# Patient Record
Sex: MALE | Race: WHITE | HISPANIC OR LATINO | Employment: FULL TIME | URBAN - METROPOLITAN AREA
[De-identification: names, ages, dates, MRNs, and addresses within clinical notes are randomized per-mention and may not be internally consistent; named-entity substitution may affect disease eponyms.]

---

## 2018-04-19 LAB
T4 FREE SERPL-MCNC: 0.77 NG/DL (ref 0.6–1.7)
TSH SERPL DL<=0.05 MIU/L-ACNC: 10 UIU/M (ref 0.45–5.33)

## 2019-01-04 ENCOUNTER — OFFICE VISIT (OUTPATIENT)
Dept: URGENT CARE | Facility: CLINIC | Age: 39
End: 2019-01-04
Payer: COMMERCIAL

## 2019-01-04 VITALS
TEMPERATURE: 99.1 F | BODY MASS INDEX: 26.66 KG/M2 | HEART RATE: 60 BPM | WEIGHT: 180 LBS | RESPIRATION RATE: 18 BRPM | DIASTOLIC BLOOD PRESSURE: 80 MMHG | HEIGHT: 69 IN | OXYGEN SATURATION: 98 % | SYSTOLIC BLOOD PRESSURE: 114 MMHG

## 2019-01-04 DIAGNOSIS — J01.90 ACUTE SINUSITIS, RECURRENCE NOT SPECIFIED, UNSPECIFIED LOCATION: Primary | ICD-10-CM

## 2019-01-04 PROCEDURE — 99213 OFFICE O/P EST LOW 20 MIN: CPT | Performed by: PHYSICIAN ASSISTANT

## 2019-01-04 RX ORDER — AMOXICILLIN AND CLAVULANATE POTASSIUM 875; 125 MG/1; MG/1
1 TABLET, FILM COATED ORAL EVERY 12 HOURS SCHEDULED
Qty: 20 TABLET | Refills: 0 | Status: SHIPPED | OUTPATIENT
Start: 2019-01-04 | End: 2019-01-14

## 2019-01-04 NOTE — PROGRESS NOTES
[address] [facility]        NAME: Ana Angeles is a 43 y o  male  : 1980    MRN: [id number]  DATE: 2019  TIME: 12:06 PM    Assessment and Plan   Acute sinusitis, recurrence not specified, unspecified location [J01 90]  1  Acute sinusitis, recurrence not specified, unspecified location  amoxicillin-clavulanate (AUGMENTIN) 875-125 mg per tablet     Educated viral vs bacterial; likely a viral URI  Instructed to begin flonase and mucinex  If symtoms persist over 7 days you may begin the antibiotic  Patient Instructions     Follow up with PCP in 3-5 days  Proceed to  ER if symptoms worsen  Chief Complaint     Chief Complaint   Patient presents with    Cold Like Symptoms     C/O sinus congestion, sore throat, post nasal drip, cough and loss of voice x 2 days  History of Present Illness       43 y o  male presents with sinus congestion, sore throat, dry cough for 2 days  Patient states he has had a loss of voice for the last 2 days  States he did not receive a flu shot  Unsure of any positive strep exposures  Patient states there is no difficulty swallowing  He does complain of postnasal drip  Denies fever, body aches  Review of Systems   Review of Systems   Constitutional: Negative for chills, fatigue and fever  HENT: Positive for postnasal drip and sore throat  Negative for congestion, ear pain, sinus pain and trouble swallowing  Eyes: Negative for pain, discharge and redness  Respiratory: Positive for cough  Negative for chest tightness, shortness of breath and wheezing  Cardiovascular: Negative for chest pain, palpitations and leg swelling  Gastrointestinal: Negative for abdominal pain, diarrhea, nausea and vomiting  Musculoskeletal: Negative for arthralgias, joint swelling and myalgias  Skin: Negative for rash  Neurological: Negative for dizziness, weakness, numbness and headaches           Current Medications       Current Outpatient Prescriptions:   amoxicillin-clavulanate (AUGMENTIN) 875-125 mg per tablet, Take 1 tablet by mouth every 12 (twelve) hours for 10 days, Disp: 20 tablet, Rfl: 0    Current Allergies     Allergies as of 01/04/2019    (No Known Allergies)            The following portions of the patient's history were reviewed and updated as appropriate: allergies, current medications, past family history, past medical history, past social history, past surgical history and problem list      History reviewed  No pertinent past medical history  History reviewed  No pertinent surgical history  No family history on file  Medications have been verified  Objective   /80   Pulse 60   Temp 99 1 °F (37 3 °C) (Tympanic)   Resp 18   Ht 5' 9" (1 753 m)   Wt 81 6 kg (180 lb)   SpO2 98%   BMI 26 58 kg/m²        Physical Exam     Physical Exam   Constitutional: He is oriented to person, place, and time  He appears well-developed and well-nourished  No distress  HENT:   Head: Normocephalic  Right Ear: Tympanic membrane and external ear normal    Left Ear: Tympanic membrane and external ear normal    Nose: Nose normal    Mouth/Throat: Uvula is midline and mucous membranes are normal  Posterior oropharyngeal erythema present  Eyes: Pupils are equal, round, and reactive to light  Conjunctivae and EOM are normal    Neck: Normal range of motion  Neck supple  Cardiovascular: Normal rate, regular rhythm and normal heart sounds  No murmur heard  Pulmonary/Chest: Effort normal and breath sounds normal  No respiratory distress  He has no wheezes  Abdominal: Soft  Bowel sounds are normal  There is no tenderness  Musculoskeletal: Normal range of motion  Lymphadenopathy:     He has no cervical adenopathy  Neurological: He is alert and oriented to person, place, and time  He has normal reflexes  Skin: Skin is warm and dry  Psychiatric: He has a normal mood and affect     Nursing note and vitals reviewed

## 2019-02-04 ENCOUNTER — EVALUATION (OUTPATIENT)
Dept: PHYSICAL THERAPY | Facility: CLINIC | Age: 39
End: 2019-02-04
Payer: COMMERCIAL

## 2019-02-04 DIAGNOSIS — M25.551 RIGHT HIP PAIN: Primary | ICD-10-CM

## 2019-02-04 PROCEDURE — G8978 MOBILITY CURRENT STATUS: HCPCS

## 2019-02-04 PROCEDURE — 97162 PT EVAL MOD COMPLEX 30 MIN: CPT

## 2019-02-04 PROCEDURE — G8979 MOBILITY GOAL STATUS: HCPCS

## 2019-02-04 NOTE — LETTER
2019    [facility] [address]    Patient: Kayce Middleton   YOB: 1980   Date of Visit: 2019     Encounter Diagnosis     ICD-10-CM    1  Right hip pain M25 551        Dear Dr Johnson :    Please review the attached Plan of Care from [facility] recent visit  Please verify that you agree therapy should continue by signing the attached document and sending it back to our office  If you have any questions or concerns, please don't hesitate to call  Sincerely,    Dixie Olivarez PT      Referring Provider:      I certify that I have read the below Plan of Care and certify the need for these services furnished under this plan of treatment while under my care  MD Lester Garcia [facility] [phone]  VIA Facsimile: [phone]          PT Evaluation     Today's date: 2019  Patient name: Kayce Middleton  : 1980  MRN: [id number]  Referring provider: Mirza Howe MD  Dx:   Encounter Diagnosis     ICD-10-CM    1  Right hip pain M25 551                   Assessment  Assessment details: Kayce Middleton is a 43 y o  male presenting to outpatient physical therapy with diagnosis of Right hip and upper leg  pain   Patient's current impairments include pain, impaired soft tissue mobility, reduced range of motion, reduced strength,  and reduced activity tolerance  Patient's present functional limitations include difficulty with ADLs with increased need for assistance, reliance on medication and/or modalities for pain relief, poor tolerance for functional mobility and activity, and difficulty completing work/school responsibilities   Patient to benefit from skilled outpatient physical therapy 2x/week for 4-6 weeks in order to reduce pain, maximize pain free range of motion, increase strength and stability, and improve functional mobility/functional activity in order to maximize return to prior level of function with reduced limitations  Thank you for your referral     Impairments: abnormal gait, abnormal or restricted ROM, activity intolerance, impaired physical strength, lacks appropriate home exercise program and pain with function  Functional limitations: diff performing job duties, diff ascending stairs, pain w/ transfers , unable to runBarriers to therapy: none  Understanding of Dx/Px/POC: good  Goals  STGs to be achieved in 4 weeks:  1  Pt to demonstrate reduced subjective pain rating "at worst" by at least 2-3 points from Initial Eval in order to allow for reduced pain with ADLs and improved functional activity tolerance  2  Pt to demonstrate increased AROM of R hip and knee  by at least 5-10 degrees in order to allow for greater ease and independence with ADLs and functional mobility  3  Pt to demonstrate full PROM of R hip  in order to maximize joint mobility and function and allow for progression of exercise program and achievement of goals  4  Pt to demonstrate increased MMT of R hip and knee by at least 1/2-1 grade in order to improve safety and stability with ADLs and functional mobility  LTGs to be achieved in 6-8 weeks:  1  Pt will be I with HEP in order to continue to improve quality of life and independence and reduce risk for re-injury  2  Pt to demonstrate return to running  without limitations or restrictions  3  Pt to demonstrate improved function as noted by achieving or exceeding predicted score on FOTO outcomes assessment tool         Plan  Patient would benefit from: skilled physical therapy  Planned modality interventions: ultrasound and thermotherapy: hydrocollator packs  Planned therapy interventions: manual therapy, strengthening, stretching, therapeutic exercise and home exercise program  Frequency: 2x week  Duration in visits: 12  Duration in weeks: 6  Plan of Care beginning date: 2/4/2019  Plan of Care expiration date: 3/18/2019  Treatment plan discussed with: patient        Re-eval Date: 3/18/19    Date 19       Visit Count 1       FOTO yes       Pain In        Pain Out              Subjective Evaluation    History of Present Illness  Mechanism of injury: Pt began with R upper leg pain and spasms over the summer  Began with ms relaxants and pain meds  Had good response to this and when pt finished meds, pain ret'd slowly  Works in [employer] and is on his feet 10 hrs/day  Walks approx 14 miles / day according to I watch  Pain worse at night, diff sleeping  Over the past month pain has been radiating up into R hip  Not a recurrent problem   Quality of life: fair    Pain  Current pain rating: 3  At best pain rating: 3  At worst pain ratin  Location: R thigh rad up into hip  Quality: dull ache, radiating and pressure (sharp pain with pivoting, and transfers)  Relieving factors: medications and support  Aggravating factors: stair climbing and walking (sleeping)  Progression: no change    Social Support  Steps to enter house: yes (2)  Stairs in house: yes   Lives in: multiple-level home  Lives with: spouse and parents    Employment status: working (job duties incl putting down pallets, lifting,some pushing and pulling)  Hand dominance: right      Diagnostic Tests  No diagnostic tests performed  Treatments  Current treatment: physical therapy  Patient Goals  Patient goals for therapy: return to sport/leisure activities, decreased pain, improved balance and increased strength          Objective     Observations     Additional Observation Details  Pt amb indep w/o asst devices with min antalgic gt on R    Lumbar Screen  Lumbar range of motion within normal limits      Neurological Testing     Sensation     Hip   Left Hip   Intact: light touch    Right Hip   Intact: light touch    Reflexes   Left   Patellar (L4): normal (2+)    Right   Patellar (L4): normal (2+)    Active Range of Motion   Left Hip   Flexion: 105 degrees   Abduction: 55 degrees   External rotation (90/90): 40 degrees   Internal rotation (90/90): 25 degrees     Right Hip   Flexion: 105 (supine) degrees   Extension: 20 degrees   Abduction: 23 degrees   External rotation (90/90): 32 degrees   Internal rotation (90/90): 23 degrees   Left Knee   Flexion: 145 degrees   Extension: 0 degrees     Right Knee   Flexion: 137 degrees   Extension: 0 degrees     Passive Range of Motion     Right Hip   Flexion: 125 degrees   Abduction: 60 degrees     Strength/Myotome Testing     Left Hip   Planes of Motion   Flexion: 4+  Extension: 5  Abduction: 5  Adduction: 5  External rotation: 4+  Internal rotation: 5    Right Hip   Planes of Motion   Flexion: 4  Extension: 5  Abduction: 4  Adduction: 3+  External rotation: 3+  Internal rotation: 3+    Left Knee   Flexion: 5  Extension: 5    Right Knee   Flexion: 4+  Extension: 4          Precautions: none  Daily Treatment Diary     Manual              Ham stretch             Hip flex stretch             Quad stretch             ITB stretch                              Exercise Diary              Nustep             Leg ext mach             Leg curl mach             Leg press             1/2 squats             Step ups   Fwd  Lat  rev             Incline calf stretch             TRs/HRs             4 way SLRs             bridges             SAQs                                                                                                                                      Modalities

## 2019-02-04 NOTE — PROGRESS NOTES
PT Evaluation     Today's date: 2019  Patient name: Parviz Pope  : 1980  MRN: [id number]  Referring provider: Karen Cyr MD  Dx:   Encounter Diagnosis     ICD-10-CM    1  Right hip pain M25 551                   Assessment  Assessment details: Parviz Pope is a 43 y o  male presenting to outpatient physical therapy with diagnosis of Right hip and upper leg  pain   Patient's current impairments include pain, impaired soft tissue mobility, reduced range of motion, reduced strength,  and reduced activity tolerance  Patient's present functional limitations include difficulty with ADLs with increased need for assistance, reliance on medication and/or modalities for pain relief, poor tolerance for functional mobility and activity, and difficulty completing work/school responsibilities  Patient to benefit from skilled outpatient physical therapy 2x/week for 4-6 weeks in order to reduce pain, maximize pain free range of motion, increase strength and stability, and improve functional mobility/functional activity in order to maximize return to prior level of function with reduced limitations  Thank you for your referral     Impairments: abnormal gait, abnormal or restricted ROM, activity intolerance, impaired physical strength, lacks appropriate home exercise program and pain with function  Functional limitations: diff performing job duties, diff ascending stairs, pain w/ transfers , unable to runBarriers to therapy: none  Understanding of Dx/Px/POC: good  Goals  STGs to be achieved in 4 weeks:  1  Pt to demonstrate reduced subjective pain rating "at worst" by at least 2-3 points from Initial Eval in order to allow for reduced pain with ADLs and improved functional activity tolerance  2  Pt to demonstrate increased AROM of R hip and knee  by at least 5-10 degrees in order to allow for greater ease and independence with ADLs and functional mobility     3  Pt to demonstrate full PROM of R hip  in order to maximize joint mobility and function and allow for progression of exercise program and achievement of goals  4  Pt to demonstrate increased MMT of R hip and knee by at least 1/2-1 grade in order to improve safety and stability with ADLs and functional mobility  LTGs to be achieved in 6-8 weeks:  1  Pt will be I with HEP in order to continue to improve quality of life and independence and reduce risk for re-injury  2  Pt to demonstrate return to running  without limitations or restrictions  3  Pt to demonstrate improved function as noted by achieving or exceeding predicted score on FOTO outcomes assessment tool  Plan  Patient would benefit from: skilled physical therapy  Planned modality interventions: ultrasound and thermotherapy: hydrocollator packs  Planned therapy interventions: manual therapy, strengthening, stretching, therapeutic exercise and home exercise program  Frequency: 2x week  Duration in visits: 12  Duration in weeks: 6  Plan of Care beginning date: 2019  Plan of Care expiration date: 3/18/2019  Treatment plan discussed with: patient        Re-eval Date: 3/18/19    Date 19       Visit Count 1       FOTO yes       Pain In        Pain Out              Subjective Evaluation    History of Present Illness  Mechanism of injury: Pt began with R upper leg pain and spasms over the summer  Began with ms relaxants and pain meds  Had good response to this and when pt finished meds, pain ret'd slowly  Works in [employer] and is on his feet 10 hrs/day  Walks approx 14 miles / day according to I watch  Pain worse at night, diff sleeping  Over the past month pain has been radiating up into R hip            Not a recurrent problem   Quality of life: fair    Pain  Current pain rating: 3  At best pain rating: 3  At worst pain ratin  Location: R thigh rad up into hip  Quality: dull ache, radiating and pressure (sharp pain with pivoting, and transfers)  Relieving factors: medications and support  Aggravating factors: stair climbing and walking (sleeping)  Progression: no change    Social Support  Steps to enter house: yes (2)  Stairs in house: yes   Lives in: multiple-level home  Lives with: spouse and parents    Employment status: working (job duties incl putting down pallets, lifting,some pushing and pulling)  Hand dominance: right      Diagnostic Tests  No diagnostic tests performed  Treatments  Current treatment: physical therapy  Patient Goals  Patient goals for therapy: return to sport/leisure activities, decreased pain, improved balance and increased strength          Objective     Observations     Additional Observation Details  Pt amb indep w/o asst devices with min antalgic gt on R    Lumbar Screen  Lumbar range of motion within normal limits      Neurological Testing     Sensation     Hip   Left Hip   Intact: light touch    Right Hip   Intact: light touch    Reflexes   Left   Patellar (L4): normal (2+)    Right   Patellar (L4): normal (2+)    Active Range of Motion   Left Hip   Flexion: 105 degrees   Abduction: 55 degrees   External rotation (90/90): 40 degrees   Internal rotation (90/90): 25 degrees     Right Hip   Flexion: 105 (supine) degrees   Extension: 20 degrees   Abduction: 23 degrees   External rotation (90/90): 32 degrees   Internal rotation (90/90): 23 degrees   Left Knee   Flexion: 145 degrees   Extension: 0 degrees     Right Knee   Flexion: 137 degrees   Extension: 0 degrees     Passive Range of Motion     Right Hip   Flexion: 125 degrees   Abduction: 60 degrees     Strength/Myotome Testing     Left Hip   Planes of Motion   Flexion: 4+  Extension: 5  Abduction: 5  Adduction: 5  External rotation: 4+  Internal rotation: 5    Right Hip   Planes of Motion   Flexion: 4  Extension: 5  Abduction: 4  Adduction: 3+  External rotation: 3+  Internal rotation: 3+    Left Knee   Flexion: 5  Extension: 5    Right Knee   Flexion: 4+  Extension: 4          Precautions: none  Daily Treatment Diary     Manual              Ham stretch             Hip flex stretch             Quad stretch             ITB stretch                              Exercise Diary              Nustep             Leg ext mach             Leg curl mach             Leg press             1/2 squats             Step ups   Fwd  Lat  rev             Incline calf stretch             TRs/HRs             4 way SLRs             bridges             SAQs                                                                                                                                      Modalities

## 2019-02-05 ENCOUNTER — OFFICE VISIT (OUTPATIENT)
Dept: PHYSICAL THERAPY | Facility: CLINIC | Age: 39
End: 2019-02-05
Payer: COMMERCIAL

## 2019-02-05 DIAGNOSIS — M25.551 RIGHT HIP PAIN: Primary | ICD-10-CM

## 2019-02-05 PROCEDURE — 97110 THERAPEUTIC EXERCISES: CPT

## 2019-02-05 PROCEDURE — 97140 MANUAL THERAPY 1/> REGIONS: CPT

## 2019-02-05 NOTE — PROGRESS NOTES
Daily Note     Today's date: 2019  Patient name: Sergio Orona  : 1980  MRN: [id number]  Referring provider: Shira Carpenter MD  Dx:   Encounter Diagnosis     ICD-10-CM    1  Right hip pain M25 551                   Re-eval Date: 3/18/19    Date 19      Visit Count 1 2      FOTO yes       Pain In  2-3      Pain Out            Subjective: Pt states he is not in much pain upon arrival and states he feels much looser afyer today's tx  Objective: See treatment diary below      Assessment: Tolerated treatment well  Patient exhibited good technique with therapeutic exercises and would benefit from continued PT      Plan: Progress treatment as tolerated          Precautions: none  Daily Treatment Diary     Manual  2/5            Ham stretch 3x30"            Hip flex stretch 3x30" with strap            Quad stretch 3x30"            ITB stretch 3x30"                             Exercise Diary  2/5            Nustep L3 10 min            Leg ext mach 22# 30            Leg curl mach 33# 30              Leg press 80#  30            1/2 squats             Step ups   Fwd  Lat  rev             Incline calf stretch             TRs/HRs             4 way SLRs 30            bridges 20x5"            SAQs 3# 30x                                                                                                                                     Modalities

## 2019-02-11 ENCOUNTER — OFFICE VISIT (OUTPATIENT)
Dept: PHYSICAL THERAPY | Facility: CLINIC | Age: 39
End: 2019-02-11
Payer: COMMERCIAL

## 2019-02-11 DIAGNOSIS — M25.551 RIGHT HIP PAIN: Primary | ICD-10-CM

## 2019-02-11 PROCEDURE — 97140 MANUAL THERAPY 1/> REGIONS: CPT

## 2019-02-11 PROCEDURE — 97110 THERAPEUTIC EXERCISES: CPT

## 2019-02-11 NOTE — PROGRESS NOTES
Daily Note     Today's date: 2019  Patient name: Emy Hernández  : 1980  MRN: [id number]  Referring provider: Evonne Stark MD  Dx:   Encounter Diagnosis     ICD-10-CM    1  Right hip pain M25 551          Re-eval Date: 3/18/19    Date 19 2 11 19     Visit Count 1 2 3     FOTO yes       Pain In  2-3 -3/10     Pain Out   -2/10               Subjective: Pt  States Pain @ R hip and upper thigh region persists  Level = "-3"/10      Objective: See treatment diary below      Assessment: Tolerated treatment fairly well overall    Patient would benefit from continued PT      Plan: Con't  services 2x/week as per POC/Goals  Precautions: none  Daily Treatment Diary     Manual   2 11 19      Ham stretch 3x30" 3x30"      Hip flex stretch 3x30" with strap 3x30"      Quad stretch 3x30" 3x30"      ITB stretch 3x30" 3x30"                  Exercise Diary   2 11 19      Nustep L3 10 min L3 10 min        Leg ext mach 22# 30 22# 30        Leg curl mach 33# 30 33# 30      Leg press 80#  30 80# 40x      1/2 squats  *20x        Step ups   Fwd  Lat  rev  *NV      Incline calf stretch  *B 5x20"      TRs/HRs  * 30x        4 way SLRs 30 30 reps ea   L        bridges 20x5" 20x5"        SAQs 3# 30x 3# 30x                                                                                    Modalities   2 11 19      Moist Heat  R Hip  R Upper thigh  10 min ea

## 2019-02-13 ENCOUNTER — APPOINTMENT (OUTPATIENT)
Dept: PHYSICAL THERAPY | Facility: CLINIC | Age: 39
End: 2019-02-13
Payer: COMMERCIAL

## 2019-02-15 ENCOUNTER — OFFICE VISIT (OUTPATIENT)
Dept: PHYSICAL THERAPY | Facility: CLINIC | Age: 39
End: 2019-02-15
Payer: COMMERCIAL

## 2019-02-15 DIAGNOSIS — M25.551 RIGHT HIP PAIN: Primary | ICD-10-CM

## 2019-02-15 PROCEDURE — 97110 THERAPEUTIC EXERCISES: CPT

## 2019-02-15 PROCEDURE — 97140 MANUAL THERAPY 1/> REGIONS: CPT

## 2019-02-15 NOTE — PROGRESS NOTES
Daily Note     Today's date: 2/15/2019  Patient name: Anisa Awad  : 1980  MRN: [id number]  Referring provider: Chet Perales MD  Dx:   Encounter Diagnosis     ICD-10-CM    1  Right hip pain M25 551          Re-eval Date: 3/18/19    Date 19 2 11 19 2 15 19    Visit Count 1 2 3 4    FOTO yes       Pain In  2-3 -3/10 4/10    Pain Out   -2/10 1/10                Subjective:  Pt  States he had a very busy and active workday yesterday, which in turn created greater symptoms at his R Hip and R LB  Pain level up to "4" at this present time  Objective: See treatment diary below      Assessment: Tolerated treatment Fair+ overall    Patient had decreased pain level at conclusion of today's treatment session  Plan: Continue per plan of care  Precautions: none  Daily Treatment Diary     Manual   2 11 19 2 15 19     Ham stretch 3x30" 3x30" 4x20"     Hip flex stretch 3x30" with strap 3x30" 4x20"     Quad stretch 3x30" 3x30" 4x20"     ITB stretch 3x30" 3x30" 4x20"                 Exercise Diary   2 11 19 2 15 19     Nustep L3 10 min L3 10 min   L2 10 min     Leg ext mach 22# 30 22# 30   22# 30     Leg curl mach 33# 30 33# 30 33# 30     Leg press 80#  30 80# 40x 80# 40x     1/2 squats  *20x   20x     Step ups   Fwd  Lat  rev  *NV 1x5 reps  * for trial     Incline calf stretch  *B 5x20" B 5x20"     TRs/HRs  * 30x   30x     4 way SLRs 30 30 reps ea  R   30 reps ea   R     bridges 20x5" 20x5"   20x5"     SAQs 3# 30x 3# 30x   30x 5sec hold R                                                                                 Modalities   2 11 19 2 15 19     Moist Heat  R Hip  R Upper thigh  10 min ea R Hip/LB  R Upper thigh  10 min ea

## 2019-02-18 ENCOUNTER — TRANSCRIBE ORDERS (OUTPATIENT)
Dept: PHYSICAL THERAPY | Facility: CLINIC | Age: 39
End: 2019-02-18

## 2019-02-18 ENCOUNTER — OFFICE VISIT (OUTPATIENT)
Dept: PHYSICAL THERAPY | Facility: CLINIC | Age: 39
End: 2019-02-18
Payer: COMMERCIAL

## 2019-02-18 DIAGNOSIS — M25.551 RIGHT HIP PAIN: Primary | ICD-10-CM

## 2019-02-18 PROCEDURE — 97110 THERAPEUTIC EXERCISES: CPT

## 2019-02-18 PROCEDURE — 97140 MANUAL THERAPY 1/> REGIONS: CPT

## 2019-02-18 NOTE — PROGRESS NOTES
Daily Note     Today's date: 2019  Patient name: Chip Mosquera  : 1980  MRN: [id number]  Referring provider: Kellee Cash MD  Dx:   Encounter Diagnosis     ICD-10-CM    1  Right hip pain M25 551      Re-eval Date: 3/18/19    Date 19 2 11 19 2 15 19 2 18 19   Visit Count 1 2 3 4 5   FOTO yes       Pain In  2-3 -3/10 4/10 1/10   Pain Out   1-2/10 1/10 1/10                Subjective:  Pt  States  pain level is "1"/10 this morning, @ R hip and ant/lat quad regions, however it was "6" last night  Objective: See treatment diary below      Assessment: Tolerated treatment fairly well overall    Patient feels much better after therapy each time, especially with the manual stretching  Plan: Continue per plan of care  Precautions: none  Daily Treatment Diary     Manual   2 11 19 2 15 19 2 18 19    Ham stretch 3x30" 3x30" 4x20" 4x20"    Hip flex stretch 3x30" with strap 3x30" 4x20" 4x20"    Quad stretch 3x30" 3x30" 4x20" 4x20"    ITB stretch 3x30" 3x30" 4x20" 4x20"                Exercise Diary   2 11 19 2 15 19 2 18 19    Nustep L3 10 min L3 10 min   L2 10 min L2 11 min    Leg ext mach 22# 30 22# 30   22# 30 22# 40x    Leg curl mach 33# 30 33# 30 33# 30 33# 40x    Leg press 80#  30 80# 40x 80# 40x 85# 30x    1/2 squats  *20x   20x 25x    Step ups   Fwd  Lat  rev  *NV 1x5 reps  * for trial *Fwd onto [employer]  Up R, Down L  1x15 reps    Incline calf stretch  *B 5x20" B 5x20" B 6x20"    TRs/HRs  * 30x   30x 40x    4 way SLRs 30 30 reps ea  R   30 reps ea   R 2x20 ea    bridges 20x5" 20x5"   20x5" 25x 5"    SAQs 3# 30x 3# 30x   30x 5sec hold R 30x 5sec hold R    Stand Quad Stretch    *R 5x10"                                                                        Modalities   2 11 19 2 15 19 2 18 19    Moist Heat  R Hip  R Upper thigh  10 min ea R Hip/LB  R Upper thigh  10 min ea Deferred

## 2019-02-19 ENCOUNTER — OFFICE VISIT (OUTPATIENT)
Dept: PHYSICAL THERAPY | Facility: CLINIC | Age: 39
End: 2019-02-19
Payer: COMMERCIAL

## 2019-02-19 DIAGNOSIS — M25.551 RIGHT HIP PAIN: Primary | ICD-10-CM

## 2019-02-19 PROCEDURE — 97140 MANUAL THERAPY 1/> REGIONS: CPT

## 2019-02-19 PROCEDURE — 97110 THERAPEUTIC EXERCISES: CPT

## 2019-02-19 NOTE — PROGRESS NOTES
Daily Note     Today's date: 2019  Patient name: Emy Hernández  : 1980  MRN: [id number]  Referring provider: Evonne Stark MD  Dx:   Encounter Diagnosis     ICD-10-CM    1  Right hip pain M25 551          Re-eval Date: 3/18/19    Date 19       Visit Count 6       FOTO        Pain In 1/10       Pain Out 0-1/10                 Subjective:  "Pretty good", is pt's response to current status of R Hip/Quad regions  Says his pain level today is only "one-eileen "      Objective: See treatment diary below      Assessment: Tolerated treatment Fairly Well  Plan: Progress treatment as tolerated  Con't services 2x/week        Precautions: none  Daily Treatment Diary     Manual  2 19 19       Ham stretch 4x30"         Hip flex stretch 4x30"        Quad stretch 5x20"  *Stand with towel assist       ITB stretch 4x20"                       Exercise Diary  2 19 19       Nustep L3 12 min         Leg ext mach 22# 45         Leg curl mach 33# 40       Leg press 85# 40x         1/2 squats 25x         Step upsFwd  Lat  rev Fwd onto AeroMat  Up R, Down L  1x20 reps       Incline calf stretch B 6x20"       TRs/HRs 45x         4 way SLRs 2x20         bridges 25x5"         SAQs 30x 5sec hold R       Stand Quad Stretch R 5x 20"                                                                           Modalities  2 19 19       Moist Heat Deferred

## 2019-02-25 ENCOUNTER — OFFICE VISIT (OUTPATIENT)
Dept: PHYSICAL THERAPY | Facility: CLINIC | Age: 39
End: 2019-02-25
Payer: COMMERCIAL

## 2019-02-25 DIAGNOSIS — M25.551 RIGHT HIP PAIN: Primary | ICD-10-CM

## 2019-02-25 PROCEDURE — 97140 MANUAL THERAPY 1/> REGIONS: CPT

## 2019-02-25 PROCEDURE — 97110 THERAPEUTIC EXERCISES: CPT

## 2019-02-25 NOTE — PROGRESS NOTES
Daily Note     Today's date: 2019  Patient name: Fallon Dhillon  : 1980  MRN: [id number]  Referring provider: Richard Orellana MD  Dx:   Encounter Diagnosis     ICD-10-CM    1  Right hip pain M25 551          Re-eval Date: 3/18/19    Date 2  2 25 19      Visit Count 6 7      FOTO        Pain In 1/10 1/10      Pain Out 0-1/10 0-10                Subjective:  No specific complaints or concerns voiced by pt  Today  Objective: See treatment diary below      Assessment: Tolerated treatment Fairly Well overall    Patient noted with R Quad Mm soreness with some of the exercises performed today, specifically the machines  Plan: Continue per plan of care         Precautions: none  Daily Treatment Diary     Manual  2  2 25 19      Ham stretch 4x30"   5x30"      Hip flex stretch 4x30"  5x30"      Quad stretch 5x20"  *Stand with towel assist 5x20"  *Stand with Hand assist      ITB stretch 4x20"     5x20"                      Exercise Diary  2  19 2 25 19      Nustep L3 12 min   *SRC  L2 12 min      Leg ext mach 22# 45   22# 45      Leg curl mach 33# 40 33# 45      Leg press 85# 40x   85# 45x      1/2 squats 25x   25x      Step upsFwd    Lat    rev Fwd onto AeroMat  Up R, Down L  1x20 reps Fwd onto AeroMat  Up R, Down L  1x25 reps      Incline calf stretch B 6x20" B 7x20"      TRs/HRs 45x   50x      4 way SLRs 2x20   2x20      bridges 25x5"   25x5"      SAQs 30x 5sec hold R 30x 5sec hold R      Stand Quad Stretch R 5x 20" R 6x 20"                                                                          Modalities  2  19 2 25 19      Moist Heat Deferred Deferred

## 2019-02-26 ENCOUNTER — OFFICE VISIT (OUTPATIENT)
Dept: PHYSICAL THERAPY | Facility: CLINIC | Age: 39
End: 2019-02-26
Payer: COMMERCIAL

## 2019-02-26 DIAGNOSIS — M25.551 RIGHT HIP PAIN: Primary | ICD-10-CM

## 2019-02-26 PROCEDURE — 97140 MANUAL THERAPY 1/> REGIONS: CPT

## 2019-02-26 PROCEDURE — 97110 THERAPEUTIC EXERCISES: CPT

## 2019-02-26 NOTE — PROGRESS NOTES
Daily Note     Today's date: 2019  Patient name: Sandrine Elias  : 1980  MRN: [id number]  Referring provider: Sandy Savage MD  Dx:   Encounter Diagnosis     ICD-10-CM    1  Right hip pain M25 551      Re-eval Date: 3/18/19    Date 2 19 19 2 25 19 2 26 19     Visit Count 6 7 8     FOTO   YES     Pain In 1/10 1/10 > 1     Pain Out 0-1/10 0/10 0/10                  Subjective:  No complaints or concerns voiced today re: R Hip  Objective: See treatment diary below      Assessment: Tolerated treatment well  Patient with continued progress thus far  Plan: Continue per plan of care       Precautions: none  Daily Treatment Diary     Manual  2 19 19 2 25 19 2 26 19     Ham stretch 4x30"   5x30" 5x30"     Hip flex stretch 4x30"  5x30" 5x30"     Quad stretch 5x20"  *Stand with towel assist 5x20"  *Stand with Hand assist 6x20"  *Stand with Hand assist     ITB stretch 4x20"     5x20"     5x20"                 Exercise Diary  2 19 19 2 25 19 2 26 19     Nustep L3 12 min   *SRC  L2 12 min SRC  L3 12 min     Leg ext mach 22# 45   22# 45 33# 30x     Leg curl mach 33# 40 33# 45 33# 45     Leg press 85# 40x   85# 45x Squat 90# 40x   *calf raise 50# 30x     1/2 squats 25x   25x 30x     Step upsFwd    Lat    rev Fwd onto AeroMat  Up R, Down L  1x20 reps Fwd onto AeroMat  Up R, Down L  1x25 reps Fwd onto AeroMat  Up R, Down L  1x25 reps     Incline calf stretch B 6x20" B 7x20" B 7x20"     TRs/HRs 45x   50x *On leg Press     4 way SLRs 2x20   2x20 3x15     bridges 25x5"   25x5" 30x 5"     SAQs 30x 5sec hold R 30x 5sec hold R 30x 5sec hold R     Stand Quad Stretch R 5x 20" R 6x 20" R 6x 20"                                                                         Modalities  2 19 19 2 25 19 2 26 19     Moist Heat Deferred Deferred Deferred

## 2019-03-04 ENCOUNTER — OFFICE VISIT (OUTPATIENT)
Dept: PHYSICAL THERAPY | Facility: CLINIC | Age: 39
End: 2019-03-04
Payer: COMMERCIAL

## 2019-03-04 DIAGNOSIS — M25.551 RIGHT HIP PAIN: Primary | ICD-10-CM

## 2019-03-04 PROCEDURE — 97110 THERAPEUTIC EXERCISES: CPT

## 2019-03-04 PROCEDURE — 97140 MANUAL THERAPY 1/> REGIONS: CPT

## 2019-03-04 NOTE — PROGRESS NOTES
Daily Note     Today's date: 3/4/2019  Patient name: Fallon Dhillon  : 1980  MRN: [id number]  Referring provider: Richard Orellana MD  Dx:   Encounter Diagnosis     ICD-10-CM    1  Right hip pain M25 551      Re-eval Date: 3/18/19    Date 2 19 19 2 25 19 2 26 19 3 4 19    Visit Count 6 7 8 9    FOTO   YES     Pain In 1/10 1/10 > 1 0/10    Pain Out 0-1/10 0/10 010 010                 Subjective:  Pt  States he has "No Pain" @ R hip or R Quad  Regions today  Objective: See treatment diary below      Assessment: Tolerated treatment well  Patient demonstrating consistent progress thus far  Plan: Continue per plan of care       Precautions: none  Daily Treatment Diary     Manual  2 19 19 2 25 19 2 26 19 3 4 19    Ham stretch 4x30"   5x30" 5x30" 5x30"    Hip flex stretch 4x30"  5x30" 5x30" 5x30"    Quad stretch 5x20"  *Stand with towel assist 5x20"  *Stand with Hand assist 6x20"  *Stand with Hand assist 6x20"  *Stand with Hand assist    ITB stretch 4x20"     5x20"     5x20" 5x20"                Exercise Diary  2 19 19 2 25 19 2 26 19 3 4 19    Nustep L3 12 min   *[facility]  L2 12 min SRC  L3 12 min SRC  L3 12 min    Leg ext mach 22# 45   22# 45 33# 30x 33# 45x    Leg curl mach 33# 40 33# 45 33# 45 44# 30x    Leg press 85# 40x   85# 45x Squat 90# 40x   *calf raise 50# 30x Squat 90# 45x   calf raise 60# 30x    1/2 squats 25x   25x 30x 30x    Step upsFwd    Lat    rev Fwd onto AeroMat  Up R, Down L  1x20 reps Fwd onto AeroMat  Up R, Down L  1x25 reps Fwd onto AeroMat  Up R, Down L  1x25 reps Fwd onto 4" step 30x  Up R, Down L  1x30 reps  *Lat 30x    Incline calf stretch B 6x20" B 7x20" B 7x20" B 8x20"    TRs/HRs 45x   50x *On leg Press     4 way SLRs 2x20   2x20 3x15 1 5# 3x10ea    bridges 25x5"   25x5" 30x 5" 30x 5"    SAQs 30x 5sec hold R 30x 5sec hold R 30x 5sec hold R 40x 5sec hold R    Stand Quad Stretch R 5x 20" R 6x 20" R 6x 20" R 7x 20" Modalities  2 19 19 2 25 19 2 26 19 3 4 19    Moist Heat Deferred Deferred Deferred Declined

## 2019-03-05 ENCOUNTER — OFFICE VISIT (OUTPATIENT)
Dept: PHYSICAL THERAPY | Facility: CLINIC | Age: 39
End: 2019-03-05
Payer: COMMERCIAL

## 2019-03-05 DIAGNOSIS — M25.551 RIGHT HIP PAIN: Primary | ICD-10-CM

## 2019-03-05 PROCEDURE — 97140 MANUAL THERAPY 1/> REGIONS: CPT

## 2019-03-05 PROCEDURE — 97110 THERAPEUTIC EXERCISES: CPT

## 2019-03-05 NOTE — PROGRESS NOTES
Daily Note     Today's date: 3/5/2019  Patient name: Angie Jimenez  : 1980  MRN: [id number]  Referring provider: Jeanna Avila MD  Dx:   Encounter Diagnosis     ICD-10-CM    1  Right hip pain M25 551          Re-eval Date: 3/18/19    Date 2 19 19 2 25 19 2 26 19 3 4 19 3 5 19   Visit Count 6 7 8 9 10   FOTO   YES     Pain In 1/10 1/10 > 1 0/10 0/10   Pain Out 0-1/10 010 010 0/10 0/10             Subjective:  Pt  States he's doing very well today re: status of R Hip, and has been doing good the last several days, as well  Objective: See treatment diary below      Assessment: Tolerated treatment well  Patient progressing very well to date  Plan:  Con't services 2x/week as per POC/Goals      Precautions: none  Daily Treatment Diary     Manual  2 19 19 2 25 19 2 26 19 3 4 19 3 5 19   Ham stretch 4x30"   5x30" 5x30" 5x30" 6x30"   Hip flex stretch 4x30"  5x30" 5x30" 5x30" 6x30"   Quad stretch 5x20"  *Stand with towel assist 5x20"  *Stand with Hand assist 6x20"  *Stand with Hand assist 6x20"  *Stand with Hand assist 6x20"  Stand with Hand assist   ITB stretch 4x20"     5x20"     5x20" 5x20" 6x20"               Exercise Diary  2 19 19 2 25 19 2 26 19 3 4 19 3 5 19   Nustep L3 12 min   *SRC  L2 12 min SRC  L3 12 min SRC  L3 12 min SRC  L3 13 min   Leg ext mach 22# 45   22# 45 33# 30x 33# 45x 33# 45x   Leg curl mach 33# 40 33# 45 33# 45 44# 30x 44# 30x   Leg press 85# 40x   85# 45x Squat 90# 40x   *calf raise 50# 30x Squat 90# 45x   calf raise 60# 30x Squat 90# 45x   calf raise 60# 40x   1/2 squats 25x   25x 30x 30x 35x   Step upsFwd    Lat    rev Fwd onto AeroMat  Up R, Down L  1x20 reps Fwd onto AeroMat  Up R, Down L  1x25 reps Fwd onto AeroMat  Up R, Down L  1x25 reps Fwd onto 4" step 30x  Up R, Down L  1x30 reps  *Lat 30x Fwd onto 4" step 30x  Up R, Down L  1x30 reps  *Lat 30x   Incline calf stretch B 6x20" B 7x20" B 7x20" B 8x20" B 8x20"   TRs/HRs 45x   50x *On leg Press     4 way SLRs 2x20   2x20 3x15 1 5# 3x10ea 1 5# 3x10ea   bridges 25x5"   25x5" 30x 5" 30x 5" 35x 5"   SAQs 30x 5sec hold R 30x 5sec hold R 30x 5sec hold R 40x 5sec hold R 40x 5sec hold R   Stand Quad Stretch R 5x 20" R 6x 20" R 6x 20" R 7x 20" R 7x 20"                                                                       Modalities  2 19 19 2 25 19 2 26 19 3 4 19 3 5 19   Moist Heat Deferred Deferred Deferred Declined deferred

## 2019-03-11 ENCOUNTER — OFFICE VISIT (OUTPATIENT)
Dept: PHYSICAL THERAPY | Facility: CLINIC | Age: 39
End: 2019-03-11
Payer: COMMERCIAL

## 2019-03-11 DIAGNOSIS — M25.551 RIGHT HIP PAIN: Primary | ICD-10-CM

## 2019-03-11 PROCEDURE — 97140 MANUAL THERAPY 1/> REGIONS: CPT

## 2019-03-11 PROCEDURE — 97110 THERAPEUTIC EXERCISES: CPT

## 2019-03-11 NOTE — PROGRESS NOTES
Daily Note     Today's date: 3/11/2019  Patient name: Michael Shah  : 1980  MRN: [id number]  Referring provider: Delbert Willson MD  Dx:   Encounter Diagnosis     ICD-10-CM    1  Right hip pain M25 551      Re-eval Date: 3/18/19    Date 3 11 19       Visit Count 11       FOTO        Pain In 2/10 R Knee  0/10 R Hip       Pain Out 1/10 R Knee  0/10 R Hip       Next MD Visit                     Subjective:  Pt  States his R Hip and Quad regions are "Good"  Says however, his R Knee is the issue today  Objective: See treatment diary below      Assessment: Tolerated treatment Fairly Well overall  Patient noted with improved strength and decreased pain @ R Hip to date, as per his feedback  Plan: Progress treatment as tolerated  Con't  Services 2x/week as per POC      Precautions: none  Daily Treatment Diary     Manual  3 11 19       Ham stretch 6x30"         Hip flex stretch 6x30"        Quad stretch 8x30"  Stand with Hand assist       ITB stretch 6x20"                       Exercise Diary  3 11 19       Nustep *[address] [facility]  L4 13 min         Leg ext mach 33# 45x         Leg curl mach 44# 40x       Leg press Squat 90# 45x   calf raise 60# 40x         1/2 squats 35x         Step upsFwd    Lat    rev Fwd onto 4" step 30x  Up R, Down L  1x30 reps  Lat 30x  *Rev 30x       Incline calf stretch B 8x30"       TRs/HRs *On Square Foam 30x         4 way SLRs 1 5# 2x20         bridges 35x 5"         SAQs 40x 5sec hold R       Stand Quad Stretch R 8x 30"                                                                           Modalities  3 11 19       Moist Heat Deferred

## 2019-03-12 ENCOUNTER — OFFICE VISIT (OUTPATIENT)
Dept: PHYSICAL THERAPY | Facility: CLINIC | Age: 39
End: 2019-03-12
Payer: COMMERCIAL

## 2019-03-12 DIAGNOSIS — M25.551 RIGHT HIP PAIN: Primary | ICD-10-CM

## 2019-03-12 PROCEDURE — 97110 THERAPEUTIC EXERCISES: CPT

## 2019-03-12 PROCEDURE — 97140 MANUAL THERAPY 1/> REGIONS: CPT

## 2019-03-12 NOTE — PROGRESS NOTES
Daily Note     Today's date: 3/12/2019  Patient name: Citlalli Harris  : 1980  MRN: [id number]  Referring provider: Paul Easley MD  Dx:   Encounter Diagnosis     ICD-10-CM    1  Right hip pain M25 551          Re-eval Date: 3/18/19    Date 3 11 19 3 12 19      Visit Count 11 12      FOTO        Pain In 210 R Knee  0/10 R Hip 2/10 R Knee  0/10 R Hip      Pain Out 1/10 R Knee  0/10 R Hip 1/10 R Knee  0/10 R Hip      Next MD Visit                 Subjective:  Pt  States he's stiff today, which he attributes to activity yesterday and last night  Objective: See treatment diary below      Assessment: Tolerated treatment Fairly Well overall  Plan: Con't  services 2x/week as per POC/Goals Plan for re-eval next week  MD follow-up scheduled for 3 20  19      Precautions: none  Daily Treatment Diary     Manual  3 11 19 3 12 19      Ham stretch 6x30"   6x30"      Hip flex stretch 6x30"  6x30"      Quad stretch 8x30"  Stand with Hand assist 8x30"  Stand with Hand assist      ITB stretch 6x20"     6x20"                  Exercise Diary  3 11 19 3 12 19      Nustep *[address] [facility]  L4 13 min   SRC  L4 13 min      Leg ext mach 33# 45x   33# 45x      Leg curl mach 44# 40x 44# 40x      Leg press Squat 90# 45x   calf raise 60# 40x   Squat 90# 45x   calf raise 60# 40x      1/2 squats 35x   40x      Step upsFwd    Lat    rev Fwd onto 4" step 30x  Up R, Down L  1x30 reps  Lat 30x  *Rev 30x Fwd onto 4" step 30x  Up R, Down L  1x30 reps  Lat 30x  *Rev 30x      Incline calf stretch B 8x30" B 8x30"      TRs/HRs *On Square Foam 30x   On Square Foam 30x      4 way SLRs 1 5# 2x20   Resume NV      bridges 35x 5"   Resume NV      SAQs 40x 5sec hold R Resume NV      Stand Quad Stretch R 8x 30" R 8x 30"                                                                          Modalities  3 11 19 3 12 19      Moist Heat Deferred deferred

## 2019-03-18 ENCOUNTER — OFFICE VISIT (OUTPATIENT)
Dept: PHYSICAL THERAPY | Facility: CLINIC | Age: 39
End: 2019-03-18
Payer: COMMERCIAL

## 2019-03-18 ENCOUNTER — APPOINTMENT (OUTPATIENT)
Dept: PHYSICAL THERAPY | Facility: CLINIC | Age: 39
End: 2019-03-18
Payer: COMMERCIAL

## 2019-03-18 DIAGNOSIS — M25.551 RIGHT HIP PAIN: Primary | ICD-10-CM

## 2019-03-18 PROCEDURE — 97110 THERAPEUTIC EXERCISES: CPT

## 2019-03-18 PROCEDURE — 97140 MANUAL THERAPY 1/> REGIONS: CPT

## 2019-03-18 NOTE — PROGRESS NOTES
Daily Note     Today's date: 3/18/2019  Patient name: Jailyn Dunn  : 1980  MRN: [id number]  Referring provider: Kj Garcia MD  Dx:   Encounter Diagnosis     ICD-10-CM    1  Right hip pain M25 551          Re-eval Date: 3/18/19    Date 3 11 19 3 12 19 3 18 19     Visit Count 11 12 13     FOTO        Pain In 2/10 R Knee  0/10 R Hip 2/10 R Knee  0/10 R Hip 2/10 R Knee  0/10 R Hip     Pain Out 1/10 R Knee  0/10 R Hip 1/10 R Knee  0/10 R Hip 1/10 R Knee  0/10 R Hip     Next MD Visit                Subjective:  Pt  States his R Hip is doing fine, however his R Knee is about a "2" Pain level  Objective: See treatment diary below      Assessment: Tolerated treatment well  Patient progressing consistently thus far  Improved ROM, flexibility, and strength, as per observation, and as per pt's feedback      Plan: Plan is for re-eval upon next treatment session tomorrow (To be provided by PT)  Con't services as per POC/Goals        Manual  3 11 19 3 12 19 3 18 19     Ham stretch 6x30"   6x30" 6x30"     Hip flex stretch 6x30"  6x30" 6x30"     Quad stretch 8x30"  Stand with Hand assist 8x30"  Stand with Hand assist 9x30"  Stand with Hand assist     ITB stretch 6x20"     6x20" 6x20"                 Exercise Diary  3 11 19 3 12 19 3 18 19     Nustep *[facility]  L4 13 min   SRC  L4 13 min SRC  L4 14 min     Leg ext mach 33# 45x   33# 45x 44# 30x     Leg curl mach 44# 40x 44# 40x 44# 45x     Leg press Squat 90# 45x   calf raise 60# 40x   Squat 90# 45x   calf raise 60# 40x Squat 95# 30x   calf raise 65# 30x     1/2 squats 35x   40x 40x     Step upsFwd    Lat    rev Fwd onto 4" step 30x  Up R, Down L  1x30 reps  Lat 30x  *Rev 30x Fwd onto 4" step 30x  Up R, Down L  1x30 reps  Lat 30x  *Rev 30x Fwd onto 4" step 40x  Up R, Down L  1x40 reps  Lat 40x  *Rev 30x     Incline calf stretch B 8x30" B 8x30" B 9x30"     TRs/HRs *On Square Foam 30x   On Square Foam 30x On Square Foam 40x     4 way SLRs 1 5# 2x20   Resume NV 1 5# 2x20     bridges 35x 5"   Resume NV 35x 5"     SAQs 40x 5sec hold R Resume NV 40x 5sec hold R     Stand Quad Stretch R 8x 30" R 8x 30" R 9x 30"                                                                         Modalities  3 11 19 3 12 19 3 18 19     Moist Heat Deferred deferred deferred

## 2019-03-19 ENCOUNTER — EVALUATION (OUTPATIENT)
Dept: PHYSICAL THERAPY | Facility: CLINIC | Age: 39
End: 2019-03-19
Payer: COMMERCIAL

## 2019-03-19 DIAGNOSIS — M25.551 RIGHT HIP PAIN: Primary | ICD-10-CM

## 2019-03-19 PROCEDURE — 97164 PT RE-EVAL EST PLAN CARE: CPT | Performed by: PHYSICAL THERAPIST

## 2019-03-19 PROCEDURE — 97140 MANUAL THERAPY 1/> REGIONS: CPT | Performed by: PHYSICAL THERAPIST

## 2019-03-19 PROCEDURE — 97110 THERAPEUTIC EXERCISES: CPT | Performed by: PHYSICAL THERAPIST

## 2019-03-19 NOTE — LETTER
2019    MD Lester [address]send [address] [place] [address]    Patient: Margarette Evans   YOB: 1980   Date of Visit: 3/19/2019     Encounter Diagnosis     ICD-10-CM    1  Right hip pain M25 551        Dear Dr Brian Hoover:    Please review the attached Plan of Care from [facility] recent visit  Please verify that you agree therapy should continue by signing the attached document and sending it back to our office  If you have any questions or concerns, please don't hesitate to call  Sincerely,    Rylan Grayson, PT      Referring Provider:      I certify that I have read the below Plan of Care and certify the need for these services furnished under this plan of treatment while under my care  MD Lester Townsend [address]: [phone]          PT Re-Evaluation     Today's date: 3/19/2019  Patient name: Margarette Evans  : 1980  MRN: [id number]  Referring provider: Lieutenant Vito MD  Dx:   Encounter Diagnosis     ICD-10-CM    1  Right hip pain M25 551        Assessment/Plan  Assessment details: Margarette Evans is a 43 y o  male presenting to outpatient physical therapy for Re-Evaluation of Right hip and upper leg pain  The patient has attended 13 PT visits for this  Mr Robin Si reports improved right hip symptoms and he has since returned to some short distance jogging without complaints of right hip pain  His gait is no longer antalgic  He does however report that his right knee is especially bothersome when he is resting or standing for prolonged periods  General screen does not reveal any swelling, ligamentous instability or tenderness to palpation to the knee joint specifically  He does report some tenderness to palpation of the distal ITB and lateral hamstrings  He demonstrates hamstring tightness   Mr Norma Natarajan would benefit from updated POC to focus on treatment of the right knee/lateral knee and quadriceps with emphasis on manual therapy  Patient's present functional limitations include difficulty with ADLs with increased need for assistance, reliance on medication and/or modalities for pain relief, poor tolerance for functional mobility and activity, and difficulty completing work/school responsibilities  Patient to benefit from skilled outpatient physical therapy 2x/week for 4 weeks in order to reduce pain, maximize pain free range of motion, increase strength and stability, and improve functional mobility/functional activity in order to maximize return to prior level of function with reduced limitations  Thank you for your referral     Impairments: activity intolerance, impaired physical strength, lacks appropriate home exercise program and pain with function  Functional limitations: diff performing job duties, muscle spasm in knee flexion/resting positions , unable to run previous long distances  Barriers to therapy: none  Understanding of Dx/Px/POC: good  Goals  STGs to be achieved in 4 weeks:  1  Pt to demonstrate reduced subjective pain rating "at worst" by at least 2-3 points from Initial Eval in order to allow for reduced pain with ADLs and improved functional activity tolerance  Progress: Pain today 0/10    2  Pt to demonstrate increased AROM of R hip and knee  by at least 5-10 degrees in order to allow for greater ease and independence with ADLs and functional mobility  Progress: met  3  Pt to demonstrate full PROM of R hip  in order to maximize joint mobility and function and allow for progression of exercise program and achievement of goals  Progress MET  4  Pt to demonstrate increased MMT of R hip and knee by at least 1/2-1 grade in order to improve safety and stability with ADLs and functional mobility  Progress: Ongoing, continued weakness noted to right hip extensors and  Hip IRs  LTGs to be achieved in 6-8 weeks:  1   Pt will be I with HEP in order to continue to improve quality of life and independence and reduce risk for re-injury  Progress: Pt reports independence with his HEP  2  Pt to demonstrate return to running  without limitations or restrictions  Progress: Patient reports he just started running a half a mile  He wants to get back to running about 6 miles a day  3  Pt to demonstrate improved function as noted by achieving or exceeding predicted score on FOTO outcomes assessment tool  Progress: Ongoing    Updated Goals  3/19/2019  1) Patient will report right lateral knee/hamstring spasm reduced to less than 1 incident in 5 day periods with pain report 0/10 for 3 consecutive days  Plan  Patient would benefit from: skilled physical therapy  Planned modality interventions: ultrasound and thermotherapy: hydrocollator packs  Planned therapy interventions: manual therapy, strengthening, stretching, therapeutic exercise and home exercise program  Frequency: 2x week  Duration in visits: 8  Duration in weeks: 4  Plan of Care beginning date: 3/19/2019  Plan of Care expiration date: 4/20/19  Treatment plan discussed with: patient      Subjective    Subjective Evaluation    History of Present Illness  Mechanism of injury: Pt began with R upper leg pain and spasms over the summer  Began with ms relaxants and pain meds  Had good response to this and when pt finished meds, pain ret'd slowly  Works in [employer] and is on his feet 10 hrs/day  Walks approx 14 miles / day according to I watch  Pain was worse at night and he had diff sleeping  Prior to starting PT the pain had been radiating up into R hip  Patient reports his hip pain at night and during activity is "pretty much non-existant"  He reports his quad pain has improved  He reports increased right knee pain  "If I sit I have to have my knee propped up, the more I sit it won't relax and I feel my hamstring spasming   He reports pain at rest however "when I'm standing and walking it's not too bad" however when standing for prolonged periods at work he has to prop his right knee up  He reports the knee pain started within the last two weeks          Not a recurrent problem   Quality of life: fair      Social Support  Steps to enter house: yes (2)  Stairs in house: yes   Lives in: multiple-level home  Lives with: spouse and parents    Employment status: working (job duties incl putting down pallets, lifting,some pushing and pulling)  Hand dominance: right      Diagnostic Tests  No diagnostic tests performed  Treatments  Current treatment: physical therapy  Patient Goals  Patient goals for therapy: return to sport/leisure activities, decreased pain, improved balance and increased strength      Objective  Objective     Observations     Additional Observation Details  Gait symmetrical, non-antalgic  Lumbar Screen  Lumbar range of motion within normal limits  Neurological Testing     Sensation   Bilateral LEs, intact to LT       Reflexes   Left   Patellar (L4): normal (2+)    Right   Patellar (L4): normal (2+)    Passive Range of Motion   Left Hip   Flexion: 125 degrees   Abduction: 55 degrees   External rotation (90/90): 45 degrees   Internal rotation (90/90): 25 degrees     Right Hip   Flexion: 125 (supine) degrees   Extension: 20 degrees   Abduction: 25 degrees   External rotation (90/90): 45 degrees   Internal rotation (90/90): 23 degrees   Left Knee   Flexion: 145 degrees   Extension: 0 degrees     Right Knee   Flexion: 142 degrees   Extension: 0 degrees       Strength/Myotome Testing     Left Hip   Planes of Motion   Flexion: 4+  Extension: 5  Abduction: 5  Adduction: 5  External rotation: 4+  Internal rotation: 5    Right Hip   Planes of Motion   Flexion: 4  Extension: 4-  Abduction: 4  Adduction: 4-  External rotation: 4  Internal rotation: 4-    Left Knee   Flexion: 5  Extension: 5    Right Knee   Flexion: 4+  Extension: 4     Flowsheet Rows      Most Recent Value   PT/OT G-Codes   Current Score  66   Projected Score  65 Re-eval Date: 3/18/19    Date 3 11 19 3 12 19 3 18 19 3/19/19    Visit Count 11 12 13 14    FOTO        Pain In 2/10 R Knee  0/10 R Hip 2/10 R Knee  0/10 R Hip 2/10 R Knee  0/10 R Hip 2/10 R knee  0/10 R hip    Pain Out 1/10 R Knee  0/10 R Hip 1/10 R Knee  0/10 R Hip 1/10 R Knee  0/10 R Hip 1/10 R knee  0/10 R hip    Next MD Visit March 20th March 20th March 20th       Objective: See treatment diary below  Precautions: Standard    Manual  3 11 19 3 12 19 3 18 19 3/19/19    Ham stretch 6x30"   6x30" 6x30"     Hip flex stretch 6x30"  6x30" 6x30"     Quad stretch 8x30"  Stand with Hand assist 8x30"  Stand with Hand assist 9x30"  Stand with Hand assist     ITB stretch 6x20"     6x20" 6x20"     Soft tissue mobility to right lateral hamstrings and ITB  x10min    Massage roller right ITB and ham     X5min (self massage today)    Foam roller ITB            NP this visit 2/2 re-eval         Exercise Diary  3 11 19 3 12 19 3 18 19 3/19/19    Warm up *[facility]  L4 13 min   SRC  L4 13 min SRC  L4 14 min SRC L4, x16min      Leg ext mach 33# 45x   33# 45x 44# 30x NV    Leg curl mach 44# 40x 44# 40x 44# 45x NV    Leg press Squat 90# 45x   calf raise 60# 40x   Squat 90# 45x   calf raise 60# 40x Squat 95# 30x   calf raise 65# 30x NV    1/2 squats 35x   40x 40x NV, add TB    Step upsFwd    Lat    rev Fwd onto 4" step 30x  Up R, Down L  1x30 reps  Lat 30x  *Rev 30x Fwd onto 4" step 30x  Up R, Down L  1x30 reps  Lat 30x  *Rev 30x Fwd onto 4" step 40x  Up R, Down L  1x40 reps  Lat 40x  *Rev 30x NV    Incline calf stretch B 8x30" B 8x30" B 9x30" D/C to HEP    TRs/HRs *On Square Foam 30x   On Square Foam 30x On Square Foam 40x D/C to HEP    4 way SLRs 1 5# 2x20   Resume NV 1 5# 2x20 D/c    bridges 35x 5"   Resume NV 35x 5" NV with resistance (TB, ball)    SAQs 40x 5sec hold R Resume NV 40x 5sec hold R D/C HEP    Stand Quad Stretch R 8x 30" R 8x 30" R 9x 30" D/C    Supine quad/hip flexor stretch at edge of mat        Abdominal strengthening: Quadruped alt UE/LE        Prone planks        Side planks        Prone hip extension strengthening with ankle weight                                    Modalities  3 11 19 3 12 19 3 18 19 3/19/19    Moist Heat Deferred deferred deferred

## 2019-03-19 NOTE — PROGRESS NOTES
PT Re-Evaluation     Today's date: 3/19/2019  Patient name: Romana Foot  : 1980  MRN: [id number]  Referring provider: Phyllis Herrera MD  Dx:   Encounter Diagnosis     ICD-10-CM    1  Right hip pain M25 551        Assessment/Plan  Assessment details: Romana Foot is a 43 y o  male presenting to outpatient physical therapy for Re-Evaluation of Right hip and upper leg pain  The patient has attended 13 PT visits for this  Mr Mich Bosch reports improved right hip symptoms and he has since returned to some short distance jogging without complaints of right hip pain  His gait is no longer antalgic  He does however report that his right knee is especially bothersome when he is resting or standing for prolonged periods  General screen does not reveal any swelling, ligamentous instability or tenderness to palpation to the knee joint specifically  He does report some tenderness to palpation of the distal ITB and lateral hamstrings  He demonstrates hamstring tightness  Mr Tory Silva would benefit from updated POC to focus on treatment of the right knee/lateral knee and quadriceps with emphasis on manual therapy  Patient's present functional limitations include difficulty with ADLs with increased need for assistance, reliance on medication and/or modalities for pain relief, poor tolerance for functional mobility and activity, and difficulty completing work/school responsibilities  Patient to benefit from skilled outpatient physical therapy 2x/week for 4 weeks in order to reduce pain, maximize pain free range of motion, increase strength and stability, and improve functional mobility/functional activity in order to maximize return to prior level of function with reduced limitations   Thank you for your referral     Impairments: activity intolerance, impaired physical strength, lacks appropriate home exercise program and pain with function  Functional limitations: diff performing job duties, muscle spasm in knee flexion/resting positions , unable to run previous long distances  Barriers to therapy: none  Understanding of Dx/Px/POC: good  Goals  STGs to be achieved in 4 weeks:  1  Pt to demonstrate reduced subjective pain rating "at worst" by at least 2-3 points from Initial Eval in order to allow for reduced pain with ADLs and improved functional activity tolerance  Progress: Pain today 0/10    2  Pt to demonstrate increased AROM of R hip and knee  by at least 5-10 degrees in order to allow for greater ease and independence with ADLs and functional mobility  Progress: met  3  Pt to demonstrate full PROM of R hip  in order to maximize joint mobility and function and allow for progression of exercise program and achievement of goals  Progress MET  4  Pt to demonstrate increased MMT of R hip and knee by at least 1/2-1 grade in order to improve safety and stability with ADLs and functional mobility  Progress: Ongoing, continued weakness noted to right hip extensors and  Hip IRs  LTGs to be achieved in 6-8 weeks:  1  Pt will be I with HEP in order to continue to improve quality of life and independence and reduce risk for re-injury  Progress: Pt reports independence with his HEP  2  Pt to demonstrate return to running  without limitations or restrictions  Progress: Patient reports he just started running a half a mile  He wants to get back to running about 6 miles a day  3  Pt to demonstrate improved function as noted by achieving or exceeding predicted score on FOTO outcomes assessment tool  Progress: Ongoing    Updated Goals  3/19/2019  1) Patient will report right lateral knee/hamstring spasm reduced to less than 1 incident in 5 day periods with pain report 0/10 for 3 consecutive days           Plan  Patient would benefit from: skilled physical therapy  Planned modality interventions: ultrasound and thermotherapy: hydrocollator packs  Planned therapy interventions: manual therapy, strengthening, stretching, therapeutic exercise and home exercise program  Frequency: 2x week  Duration in visits: 8  Duration in weeks: 4  Plan of Care beginning date: 3/19/2019  Plan of Care expiration date: 4/20/19  Treatment plan discussed with: patient      Subjective    Subjective Evaluation    History of Present Illness  Mechanism of injury: Pt began with R upper leg pain and spasms over the summer  Began with ms relaxants and pain meds  Had good response to this and when pt finished meds, pain ret'd slowly  Works in [employer] and is on his feet 10 hrs/day  Walks approx 14 miles / day according to I watch  Pain was worse at night and he had diff sleeping  Prior to starting PT the pain had been radiating up into R hip  Patient reports his hip pain at night and during activity is "pretty much non-existant"  He reports his quad pain has improved  He reports increased right knee pain  "If I sit I have to have my knee propped up, the more I sit it won't relax and I feel my hamstring spasming  He reports pain at rest however "when I'm standing and walking it's not too bad" however when standing for prolonged periods at work he has to prop his right knee up  He reports the knee pain started within the last two weeks          Not a recurrent problem   Quality of life: fair      Social Support  Steps to enter house: yes (2)  Stairs in house: yes   Lives in: multiple-level home  Lives with: spouse and parents    Employment status: working (job duties incl putting down pallets, lifting,some pushing and pulling)  Hand dominance: right      Diagnostic Tests  No diagnostic tests performed  Treatments  Current treatment: physical therapy  Patient Goals  Patient goals for therapy: return to sport/leisure activities, decreased pain, improved balance and increased strength      Objective  Objective     Observations     Additional Observation Details  Gait symmetrical, non-antalgic  Lumbar Screen  Lumbar range of motion within normal limits      Neurological Testing     Sensation   Bilateral LEs, intact to LT  Reflexes   Left   Patellar (L4): normal (2+)    Right   Patellar (L4): normal (2+)    Passive Range of Motion   Left Hip   Flexion: 125 degrees   Abduction: 55 degrees   External rotation (90/90): 45 degrees   Internal rotation (90/90): 25 degrees     Right Hip   Flexion: 125 (supine) degrees   Extension: 20 degrees   Abduction: 25 degrees   External rotation (90/90): 45 degrees   Internal rotation (90/90): 23 degrees   Left Knee   Flexion: 145 degrees   Extension: 0 degrees     Right Knee   Flexion: 142 degrees   Extension: 0 degrees       Strength/Myotome Testing     Left Hip   Planes of Motion   Flexion: 4+  Extension: 5  Abduction: 5  Adduction: 5  External rotation: 4+  Internal rotation: 5    Right Hip   Planes of Motion   Flexion: 4  Extension: 4-  Abduction: 4  Adduction: 4-  External rotation: 4  Internal rotation: 4-    Left Knee   Flexion: 5  Extension: 5    Right Knee   Flexion: 4+  Extension: 4     Flowsheet Rows      Most Recent Value   PT/OT G-Codes   Current Score  66   Projected Score  65                Re-eval Date: 3/18/19    Date 3 11 19 3 12 19 3 18 19 3/19/19    Visit Count 11 12 13 14    FOTO        Pain In 2/10 R Knee  0/10 R Hip 2/10 R Knee  0/10 R Hip 2/10 R Knee  0/10 R Hip 2/10 R knee  0/10 R hip    Pain Out 1/10 R Knee  0/10 R Hip 1/10 R Knee  0/10 R Hip 1/10 R Knee  0/10 R Hip 1/10 R knee  0/10 R hip    Next MD Visit March 20th March 20th March 20th       Objective: See treatment diary below  Precautions: Standard    Manual  3 11 19 3 12 19 3 18 19 3/19/19    Ham stretch 6x30"   6x30" 6x30"     Hip flex stretch 6x30"  6x30" 6x30"     Quad stretch 8x30"  Stand with Hand assist 8x30"  Stand with Hand assist 9x30"  Stand with Hand assist     ITB stretch 6x20"     6x20" 6x20"     Soft tissue mobility to right lateral hamstrings and ITB       x10min    Massage roller right ITB and ham     X5min (self massage today)    Foam roller ITB NP this visit 2/2 re-eval         Exercise Diary  3 11 19 3 12 19 3 18 19 3/19/19    Warm up *[facility]  L4 13 min   SRC  L4 13 min SRC  L4 14 min SRC L4, x16min      Leg ext mach 33# 45x   33# 45x 44# 30x NV    Leg curl mach 44# 40x 44# 40x 44# 45x NV    Leg press Squat 90# 45x   calf raise 60# 40x   Squat 90# 45x   calf raise 60# 40x Squat 95# 30x   calf raise 65# 30x NV    1/2 squats 35x   40x 40x NV, add TB    Step upsFwd    Lat    rev Fwd onto 4" step 30x  Up R, Down L  1x30 reps  Lat 30x  *Rev 30x Fwd onto 4" step 30x  Up R, Down L  1x30 reps  Lat 30x  *Rev 30x Fwd onto 4" step 40x  Up R, Down L  1x40 reps  Lat 40x  *Rev 30x NV    Incline calf stretch B 8x30" B 8x30" B 9x30" D/C to HEP    TRs/HRs *On Square Foam 30x   On Square Foam 30x On Square Foam 40x D/C to HEP    4 way SLRs 1 5# 2x20   Resume NV 1 5# 2x20 D/c    bridges 35x 5"   Resume NV 35x 5" NV with resistance (TB, ball)    SAQs 40x 5sec hold R Resume NV 40x 5sec hold R D/C HEP    Stand Quad Stretch R 8x 30" R 8x 30" R 9x 30" D/C    Supine quad/hip flexor stretch at edge of mat        Abdominal strengthening: Quadruped alt UE/LE        Prone planks        Side planks        Prone hip extension strengthening with ankle weight                                    Modalities  3 11 19 3 12 19 3 18 19 3/19/19    Moist Heat Deferred deferred deferred

## 2019-03-20 ENCOUNTER — APPOINTMENT (OUTPATIENT)
Dept: LAB | Facility: MEDICAL CENTER | Age: 39
End: 2019-03-20
Payer: COMMERCIAL

## 2019-03-20 ENCOUNTER — TRANSCRIBE ORDERS (OUTPATIENT)
Dept: LAB | Facility: MEDICAL CENTER | Age: 39
End: 2019-03-20

## 2019-03-20 DIAGNOSIS — M79.604 RIGHT LEG PAIN: Primary | ICD-10-CM

## 2019-03-20 DIAGNOSIS — M79.604 RIGHT LEG PAIN: ICD-10-CM

## 2019-03-20 DIAGNOSIS — E03.9 HYPOTHYROIDISM, UNSPECIFIED TYPE: ICD-10-CM

## 2019-03-20 LAB
T4 FREE SERPL-MCNC: 0.67 NG/DL (ref 0.76–1.46)
TSH SERPL DL<=0.05 MIU/L-ACNC: 36.7 UIU/ML (ref 0.36–3.74)

## 2019-03-20 PROCEDURE — 84443 ASSAY THYROID STIM HORMONE: CPT

## 2019-03-20 PROCEDURE — 36415 COLL VENOUS BLD VENIPUNCTURE: CPT

## 2019-03-20 PROCEDURE — 84439 ASSAY OF FREE THYROXINE: CPT

## 2019-03-26 ENCOUNTER — TRANSCRIBE ORDERS (OUTPATIENT)
Dept: PHYSICAL THERAPY | Facility: CLINIC | Age: 39
End: 2019-03-26

## 2019-03-26 DIAGNOSIS — M25.551 RIGHT HIP PAIN: Primary | ICD-10-CM

## 2019-04-01 ENCOUNTER — OFFICE VISIT (OUTPATIENT)
Dept: PHYSICAL THERAPY | Facility: CLINIC | Age: 39
End: 2019-04-01
Payer: COMMERCIAL

## 2019-04-01 DIAGNOSIS — M25.551 RIGHT HIP PAIN: Primary | ICD-10-CM

## 2019-04-01 PROCEDURE — 97140 MANUAL THERAPY 1/> REGIONS: CPT

## 2019-04-01 PROCEDURE — 97110 THERAPEUTIC EXERCISES: CPT

## 2019-04-02 ENCOUNTER — OFFICE VISIT (OUTPATIENT)
Dept: PHYSICAL THERAPY | Facility: CLINIC | Age: 39
End: 2019-04-02
Payer: COMMERCIAL

## 2019-04-02 DIAGNOSIS — M25.551 RIGHT HIP PAIN: Primary | ICD-10-CM

## 2019-04-02 PROCEDURE — 97140 MANUAL THERAPY 1/> REGIONS: CPT

## 2019-04-02 PROCEDURE — 97110 THERAPEUTIC EXERCISES: CPT

## 2019-04-08 ENCOUNTER — OFFICE VISIT (OUTPATIENT)
Dept: PHYSICAL THERAPY | Facility: CLINIC | Age: 39
End: 2019-04-08
Payer: COMMERCIAL

## 2019-04-08 DIAGNOSIS — M25.551 RIGHT HIP PAIN: Primary | ICD-10-CM

## 2019-04-08 PROCEDURE — 97140 MANUAL THERAPY 1/> REGIONS: CPT

## 2019-04-08 PROCEDURE — 97110 THERAPEUTIC EXERCISES: CPT

## 2019-04-09 ENCOUNTER — OFFICE VISIT (OUTPATIENT)
Dept: PHYSICAL THERAPY | Facility: CLINIC | Age: 39
End: 2019-04-09
Payer: COMMERCIAL

## 2019-04-09 DIAGNOSIS — M25.551 RIGHT HIP PAIN: Primary | ICD-10-CM

## 2019-04-09 PROCEDURE — 97110 THERAPEUTIC EXERCISES: CPT

## 2019-04-09 PROCEDURE — 97140 MANUAL THERAPY 1/> REGIONS: CPT

## 2019-04-15 ENCOUNTER — OFFICE VISIT (OUTPATIENT)
Dept: PHYSICAL THERAPY | Facility: CLINIC | Age: 39
End: 2019-04-15
Payer: COMMERCIAL

## 2019-04-15 DIAGNOSIS — M25.551 RIGHT HIP PAIN: Primary | ICD-10-CM

## 2019-04-15 PROCEDURE — 97110 THERAPEUTIC EXERCISES: CPT

## 2019-04-15 PROCEDURE — 97140 MANUAL THERAPY 1/> REGIONS: CPT

## 2019-04-16 ENCOUNTER — OFFICE VISIT (OUTPATIENT)
Dept: PHYSICAL THERAPY | Facility: CLINIC | Age: 39
End: 2019-04-16
Payer: COMMERCIAL

## 2019-04-16 DIAGNOSIS — M25.551 RIGHT HIP PAIN: Primary | ICD-10-CM

## 2019-04-16 PROCEDURE — 97110 THERAPEUTIC EXERCISES: CPT

## 2019-04-16 PROCEDURE — 97140 MANUAL THERAPY 1/> REGIONS: CPT

## 2019-04-16 PROCEDURE — 97112 NEUROMUSCULAR REEDUCATION: CPT

## 2019-04-22 ENCOUNTER — OFFICE VISIT (OUTPATIENT)
Dept: PHYSICAL THERAPY | Facility: CLINIC | Age: 39
End: 2019-04-22
Payer: COMMERCIAL

## 2019-04-22 DIAGNOSIS — M25.551 RIGHT HIP PAIN: Primary | ICD-10-CM

## 2019-04-22 PROCEDURE — 97112 NEUROMUSCULAR REEDUCATION: CPT

## 2019-04-22 PROCEDURE — 97140 MANUAL THERAPY 1/> REGIONS: CPT

## 2019-04-22 PROCEDURE — 97110 THERAPEUTIC EXERCISES: CPT

## 2019-04-23 ENCOUNTER — EVALUATION (OUTPATIENT)
Dept: PHYSICAL THERAPY | Facility: CLINIC | Age: 39
End: 2019-04-23
Payer: COMMERCIAL

## 2019-04-23 DIAGNOSIS — M25.551 RIGHT HIP PAIN: Primary | ICD-10-CM

## 2019-04-23 PROCEDURE — 97164 PT RE-EVAL EST PLAN CARE: CPT

## 2019-04-23 PROCEDURE — 97110 THERAPEUTIC EXERCISES: CPT

## 2019-12-24 ENCOUNTER — OFFICE VISIT (OUTPATIENT)
Dept: URGENT CARE | Facility: CLINIC | Age: 39
End: 2019-12-24
Payer: COMMERCIAL

## 2019-12-24 VITALS
HEART RATE: 60 BPM | SYSTOLIC BLOOD PRESSURE: 121 MMHG | TEMPERATURE: 98.5 F | BODY MASS INDEX: 28.23 KG/M2 | HEIGHT: 69 IN | RESPIRATION RATE: 18 BRPM | OXYGEN SATURATION: 97 % | WEIGHT: 190.6 LBS | DIASTOLIC BLOOD PRESSURE: 69 MMHG

## 2019-12-24 DIAGNOSIS — M79.89 SWELLING OF RIGHT HAND: Primary | ICD-10-CM

## 2019-12-24 PROCEDURE — 99213 OFFICE O/P EST LOW 20 MIN: CPT | Performed by: PHYSICIAN ASSISTANT

## 2019-12-24 RX ORDER — PREDNISONE 10 MG/1
TABLET ORAL
Qty: 26 TABLET | Refills: 0 | Status: SHIPPED | OUTPATIENT
Start: 2019-12-24

## 2019-12-24 RX ORDER — LEVOTHYROXINE SODIUM 0.07 MG/1
75 TABLET ORAL DAILY
Refills: 1 | COMMUNITY
Start: 2019-10-18

## 2019-12-24 NOTE — PROGRESS NOTES
[facility]    NAME: Femi Guerra is a 35 y o  male  : 1980    MRN: [id number]  DATE: 2019  TIME: 1:37 PM    Assessment and Plan   Swelling of right hand [M79 89]  1  Swelling of right hand  XR hand 3+ vw right    predniSONE 10 mg tablet       Patient Instructions   Patient Instructions   Xray appears negative for any fracture  Will follow up with radiologist report when available  Recommend elevating body part, prednisone to reduce inflammation  Osteobiflex and tumeric  If not improving over the next week, follow up with PCP or orthopedics  Chief Complaint     Chief Complaint   Patient presents with    Hand Pain     Patient c/o right hand pain x 1 5 weeks, denies injury       History of Present Illness   58-year-old male here with complaint of right hand swelling and pain  Pain in his right thumb for the last week and a half  Denies any injury but patient 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  Review of Systems   Review of Systems   Constitutional: Negative for chills, fatigue and fever  HENT: Negative for congestion, ear pain, postnasal drip, sinus pressure and sore throat  Respiratory: Negative for cough, shortness of breath and wheezing  Musculoskeletal:        Right hand pain and swelling   Neurological: Negative for headaches  All other systems reviewed and are negative        Current Medications     Current Outpatient Medications:     levothyroxine 75 mcg tablet, Take 75 mcg by mouth daily, Disp: , Rfl: 1    predniSONE 10 mg tablet, Take 3 tabs BID X 2 days, 2 tabs BID X 2 days, 1 tab BID X 2 days, 1 tab daily X 2 days, Disp: 26 tablet, Rfl: 0    Current Allergies     Allergies as of 2019    (No Known Allergies)          The following portions of the patient's history were reviewed and updated as appropriate: allergies, current medications, past family history, past medical history, past social history, past surgical history and problem list    Past Medical History:   Diagnosis Date    Disease of thyroid gland      History reviewed  No pertinent surgical history  History reviewed  No pertinent family history  Social History     Socioeconomic History    Marital status: /Civil Union     Spouse name: Not on file    Number of children: Not on file    Years of education: Not on file    Highest education level: Not on file   Occupational History    Not on file   Social Needs    Financial resource strain: Not on file    Food insecurity:     Worry: Not on file     Inability: Not on file    Transportation needs:     Medical: Not on file     Non-medical: Not on file   Tobacco Use    Smoking status: Never Smoker    Smokeless tobacco: Never Used   Substance and Sexual Activity    Alcohol use: Not Currently    Drug use: Not on file    Sexual activity: Not on file   Lifestyle    Physical activity:     Days per week: Not on file     Minutes per session: Not on file    Stress: Not on file   Relationships    Social connections:     Talks on phone: Not on file     Gets together: Not on file     Attends Hinduism service: Not on file     Active member of club or organization: Not on file     Attends meetings of clubs or organizations: Not on file     Relationship status: Not on file    Intimate partner violence:     Fear of current or ex partner: Not on file     Emotionally abused: Not on file     Physically abused: Not on file     Forced sexual activity: Not on file   Other Topics Concern    Not on file   Social History Narrative    Not on file     Medications have been verified  Objective   /69   Pulse 60   Temp 98 5 °F (36 9 °C) (Tympanic)   Resp 18   Ht 5' 9" (1 753 m)   Wt 86 5 kg (190 lb 9 6 oz)   SpO2 97%   BMI 28 15 kg/m²      Physical Exam   Physical Exam   Constitutional: He appears well-developed and well-nourished  No distress     HENT:   Head: Normocephalic and atraumatic  Right Ear: Tympanic membrane normal    Left Ear: Tympanic membrane normal    Nose: No mucosal edema  Mouth/Throat: Oropharynx is clear and moist    Cardiovascular: Normal rate, regular rhythm and normal heart sounds  Pulmonary/Chest: Effort normal and breath sounds normal  No respiratory distress  Musculoskeletal:        Right hand: He exhibits tenderness (Over D IP and PIP of thumb) and swelling (Of entire right hand)  He exhibits normal range of motion  Normal sensation noted  Normal strength noted  Nursing note and vitals reviewed

## 2019-12-24 NOTE — PATIENT INSTRUCTIONS
Xray appears negative for any fracture  Will follow up with radiologist report when available  Recommend elevating body part, prednisone to reduce inflammation  Osteobiflex and tumeric  If not improving over the next week, follow up with PCP or orthopedics

## 2020-01-23 ENCOUNTER — HOSPITAL ENCOUNTER (EMERGENCY)
Facility: HOSPITAL | Age: 40
Discharge: HOME | End: 2020-01-24
Attending: EMERGENCY MEDICINE | Admitting: EMERGENCY MEDICINE
Payer: COMMERCIAL

## 2020-01-23 DIAGNOSIS — R07.9 CHEST PAIN, UNSPECIFIED: Primary | ICD-10-CM

## 2020-01-23 LAB
ALBUMIN SERPL BCP-MCNC: 4.5 G/DL (ref 3.5–5.7)
ALP SERPL-CCNC: 75 U/L (ref 40–150)
ALT SERPL W P-5'-P-CCNC: 16 U/L (ref 7–52)
ANION GAP SERPL CALCULATED.3IONS-SCNC: 9 MMOL/L (ref 4–13)
AST SERPL W P-5'-P-CCNC: 16 U/L (ref 13–39)
BASOPHILS # BLD AUTO: 0 THOUSANDS/ΜL (ref 0–0.1)
BASOPHILS NFR BLD AUTO: 1 % (ref 0–2)
BILIRUB SERPL-MCNC: 0.5 MG/DL (ref 0.2–1)
BUN SERPL-MCNC: 12 MG/DL (ref 7–25)
CALCIUM SERPL-MCNC: 9.1 MG/DL (ref 8.6–10.5)
CHLORIDE SERPL-SCNC: 107 MMOL/L (ref 98–107)
CO2 SERPL-SCNC: 27 MMOL/L (ref 21–31)
CREAT SERPL-MCNC: 1 MG/DL (ref 0.7–1.3)
EOSINOPHIL # BLD AUTO: 0.2 THOUSAND/ΜL (ref 0–0.61)
EOSINOPHIL NFR BLD AUTO: 4 % (ref 0–5)
ERYTHROCYTE [DISTWIDTH] IN BLOOD BY AUTOMATED COUNT: 13.7 % (ref 11.5–14.5)
GFR SERPL CREATININE-BSD FRML MDRD: 94 ML/MIN/1.73SQ M
GLUCOSE SERPL-MCNC: 99 MG/DL (ref 65–99)
HCT VFR BLD AUTO: 43.3 % (ref 42–47)
HGB BLD-MCNC: 14.2 G/DL (ref 14–18)
LYMPHOCYTES # BLD AUTO: 1.7 THOUSANDS/ΜL (ref 0.6–4.47)
LYMPHOCYTES NFR BLD AUTO: 32 % (ref 21–51)
MCH RBC QN AUTO: 30 PG (ref 26–34)
MCHC RBC AUTO-ENTMCNC: 32.9 G/DL (ref 31–37)
MCV RBC AUTO: 91 FL (ref 81–99)
MONOCYTES # BLD AUTO: 0.3 THOUSAND/ΜL (ref 0.17–1.22)
MONOCYTES NFR BLD AUTO: 6 % (ref 2–12)
NEUTROPHILS # BLD AUTO: 3.1 THOUSANDS/ΜL (ref 1.4–6.5)
NEUTS SEG NFR BLD AUTO: 58 % (ref 42–75)
PLATELET # BLD AUTO: 196 THOUSANDS/UL (ref 149–390)
PMV BLD AUTO: 9.2 FL (ref 8.6–11.7)
POTASSIUM SERPL-SCNC: 3.6 MMOL/L (ref 3.5–5.5)
PROT SERPL-MCNC: 7.1 G/DL (ref 6.4–8.9)
RBC # BLD AUTO: 4.74 MILLION/UL (ref 4.3–5.9)
SODIUM SERPL-SCNC: 143 MMOL/L (ref 134–143)
TROPONIN I SERPL-MCNC: <0.03 NG/ML
WBC # BLD AUTO: 5.4 THOUSAND/UL (ref 4.8–10.8)

## 2020-01-23 PROCEDURE — 84484 ASSAY OF TROPONIN QUANT: CPT

## 2020-01-23 PROCEDURE — 36415 COLL VENOUS BLD VENIPUNCTURE: CPT

## 2020-01-23 PROCEDURE — 80053 COMPREHEN METABOLIC PANEL: CPT

## 2020-01-23 PROCEDURE — 99285 EMERGENCY DEPT VISIT HI MDM: CPT | Performed by: EMERGENCY MEDICINE

## 2020-01-23 PROCEDURE — 99285 EMERGENCY DEPT VISIT HI MDM: CPT

## 2020-01-23 PROCEDURE — 85025 COMPLETE CBC W/AUTO DIFF WBC: CPT

## 2020-01-23 PROCEDURE — 93005 ELECTROCARDIOGRAM TRACING: CPT

## 2020-01-24 ENCOUNTER — APPOINTMENT (EMERGENCY)
Dept: RADIOLOGY | Facility: HOSPITAL | Age: 40
End: 2020-01-24
Payer: COMMERCIAL

## 2020-01-24 VITALS
OXYGEN SATURATION: 97 % | DIASTOLIC BLOOD PRESSURE: 71 MMHG | BODY MASS INDEX: 27.55 KG/M2 | HEART RATE: 57 BPM | WEIGHT: 186 LBS | SYSTOLIC BLOOD PRESSURE: 108 MMHG | RESPIRATION RATE: 17 BRPM | HEIGHT: 69 IN

## 2020-01-24 LAB
ATRIAL RATE: 62 BPM
P AXIS: 72 DEGREES
PR INTERVAL: 154 MS
QRS AXIS: 66 DEGREES
QRSD INTERVAL: 98 MS
QT INTERVAL: 390 MS
QTC INTERVAL: 395 MS
T WAVE AXIS: 50 DEGREES
TROPONIN I SERPL-MCNC: <0.03 NG/ML
VENTRICULAR RATE: 62 BPM

## 2020-01-24 PROCEDURE — 36415 COLL VENOUS BLD VENIPUNCTURE: CPT | Performed by: EMERGENCY MEDICINE

## 2020-01-24 PROCEDURE — 71046 X-RAY EXAM CHEST 2 VIEWS: CPT

## 2020-01-24 PROCEDURE — 84484 ASSAY OF TROPONIN QUANT: CPT | Performed by: EMERGENCY MEDICINE

## 2020-01-24 PROCEDURE — 93010 ELECTROCARDIOGRAM REPORT: CPT | Performed by: INTERNAL MEDICINE

## 2020-01-24 NOTE — ED PROVIDER NOTES
History  Chief Complaint   Patient presents with    Chest Pain     started 4 hours PTA  described now as dull, throbbing central chest pain  denies SOB  admits to dizziness, nausea  35YEAR-OLD MALE    PMH: none  Soc: no smoking/drinking/drugs  FAMILY HISTORY:  Mom passed from MI at 61yo    CC:   Substernal CP    CP started 2 days ago, similar to today, lasted 20 min while resting  Was associated w/ SOB, no diaphoresis  Pain resolved w/ rest    DESCRIBED AS SHARP    HE DENIES ANY RADIATION OF PAIN TO THE JAW NECK OR THE BACK  Pain came back 5:30 today at work - he works in a [employer], nothing strenuous    Initially it lasted 20 min, he rested and it went away  Around 6pm He went back to work, felt dizzy, pain came back - again lasted around 15 or 20 min  Told his boss and was allowed to leave work around [employer] home, got something to eat around 7:45  He has dull pain now    INTERVENTIONS:   [facility], [address] [place] [address]  NO URI SYMPTOMS    VTE  RISK FACTORS:  NONE   NO HISTORY OF PE OR DVT   NO LONG CAR RIDES OR PLANE RIDES  NO IMMOBILIZATION  NO RECENT SURGERY OR TRAUMA  NO HEMOPTYSIS  OTHER ASSOCIATED SYMPTOMS:   NO ABDOMINAL PAIN  NO NAUSEA OR VOMITING  NO STOOL CHANGES     NO URINARY COMPLAINTS: NO DYSURIA, NO HEMATURIA, NO FREQUENCY        History provided by:  Patient  Chest Pain   Pain location:  Substernal area  Pain quality: aching    Pain radiates to:  Does not radiate  Pain radiates to the back: no    Pain severity:  Mild  Onset quality:  Sudden  Timing:  Constant  Progression:  Improving  Chronicity:  New  Relieved by:  Rest  Worsened by:  Nothing tried  Ineffective treatments:  None tried  Associated symptoms: dizziness and shortness of breath    Associated symptoms: no abdominal pain, no anorexia, no anxiety, no back pain, no claudication, no cough, no diaphoresis, no dysphagia, no fatigue, no fever, no headache, no heartburn, no lower extremity edema, no nausea, no near-syncope, no palpitations, no syncope, not vomiting and no weakness    Risk factors: male sex    Risk factors: no aortic disease, no coronary artery disease, no diabetes mellitus, no high cholesterol, no hypertension, no immobilization, no Marfan's syndrome, not obese, no prior DVT/PE, no smoking and no surgery        Prior to Admission Medications   Prescriptions Last Dose Informant Patient Reported? Taking?   levothyroxine 75 mcg tablet 1/23/2020 at Unknown time  Yes Yes   Sig: Take 75 mcg by mouth daily   predniSONE 10 mg tablet Not Taking at Unknown time  No No   Sig: Take 3 tabs BID X 2 days, 2 tabs BID X 2 days, 1 tab BID X 2 days, 1 tab daily X 2 days   Patient not taking: Reported on 1/23/2020      Facility-Administered Medications: None       Past Medical History:   Diagnosis Date    Disease of thyroid gland        History reviewed  No pertinent surgical history  History reviewed  No pertinent family history  I have reviewed and agree with the history as documented  Social History     Tobacco Use    Smoking status: Never Smoker    Smokeless tobacco: Never Used   Substance Use Topics    Alcohol use: Never     Frequency: Never    Drug use: Never        Review of Systems   Constitutional: Negative for diaphoresis, fatigue and fever  HENT: Negative for trouble swallowing  Respiratory: Positive for shortness of breath  Negative for cough, chest tightness, wheezing and stridor  Cardiovascular: Positive for chest pain  Negative for palpitations, claudication, leg swelling, syncope and near-syncope  Gastrointestinal: Negative for abdominal pain, anorexia, diarrhea, heartburn, nausea and vomiting  Genitourinary: Negative for flank pain and frequency  Musculoskeletal: Negative for back pain  Neurological: Positive for dizziness  Negative for weakness and headaches  All other systems reviewed and are negative        Physical Exam  Physical Exam   Constitutional: He is oriented to person, place, and time  He appears well-developed and well-nourished  Non-toxic appearance  No distress  HENT:   Head: Normocephalic and atraumatic  Nose: Nose normal    Mouth/Throat: Oropharynx is clear and moist  No oropharyngeal exudate  Eyes: Pupils are equal, round, and reactive to light  Conjunctivae and EOM are normal  Right eye exhibits no discharge  Left eye exhibits no discharge  No scleral icterus  Neck: Normal range of motion  Neck supple  No JVD present  No tracheal deviation present  Cardiovascular: Normal rate, regular rhythm, normal heart sounds and intact distal pulses  Exam reveals no gallop and no friction rub  No murmur heard  Pulses:       Carotid pulses are 2+ on the right side, and 2+ on the left side  Radial pulses are 2+ on the right side, and 2+ on the left side  Pulmonary/Chest: Effort normal and breath sounds normal  No accessory muscle usage or stridor  No tachypnea  No respiratory distress  He has no wheezes  He has no rhonchi  He has no rales  He exhibits no tenderness  Abdominal: Soft  Bowel sounds are normal  He exhibits no distension and no mass  There is no tenderness  There is no rebound and no guarding  No hernia  Musculoskeletal: Normal range of motion  He exhibits no edema, tenderness or deformity  Right lower leg: Normal         Left lower leg: Normal    Lymphadenopathy:     He has no cervical adenopathy  Neurological: He is alert and oriented to person, place, and time  He is not disoriented  No cranial nerve deficit or sensory deficit  He exhibits normal muscle tone  Coordination normal    Skin: Skin is warm  Capillary refill takes less than 2 seconds  No rash noted  He is not diaphoretic  No erythema  No pallor  Psychiatric: He has a normal mood and affect  His behavior is normal  Judgment and thought content normal  His mood appears not anxious  He is not agitated         Vital Signs  ED Triage Vitals [01/23/20 2139]   Temp Pulse Respirations Blood Pressure SpO2   -- 60 18 116/74 98 %      Temp src Heart Rate Source Patient Position - Orthostatic VS BP Location FiO2 (%)   -- Monitor -- -- --      Pain Score       3           Vitals:    01/24/20 0305 01/24/20 0315 01/24/20 0330 01/24/20 0345   BP: 119/73  108/71    Pulse: 61 57 58 57         Visual Acuity      ED Medications  Medications - No data to display    Diagnostic Studies  Results Reviewed     Procedure Component Value Units Date/Time    Troponin I [[id number]]  (Normal) Collected:  01/24/20 0304    Lab Status:  Final result Specimen:  Blood from Arm, Left Updated:  01/24/20 0332     Troponin I <0 03 ng/mL     Comprehensive metabolic panel [[id number]] Collected:  01/23/20 2147    Lab Status:  Final result Specimen:  Blood from Arm, Left Updated:  01/23/20 2215     Sodium 143 mmol/L      Potassium 3 6 mmol/L      Chloride 107 mmol/L      CO2 27 mmol/L      ANION GAP 9 mmol/L      BUN 12 mg/dL      Creatinine 1 00 mg/dL      Glucose 99 mg/dL      Calcium 9 1 mg/dL      AST 16 U/L      ALT 16 U/L      Alkaline Phosphatase 75 U/L      Total Protein 7 1 g/dL      Albumin 4 5 g/dL      Total Bilirubin 0 50 mg/dL      eGFR 94 ml/min/1 73sq m     Narrative:       Sameer guidelines for Chronic Kidney Disease (CKD):     Stage 1 with normal or high GFR (GFR > 90 mL/min/1 73 square meters)    Stage 2 Mild CKD (GFR = 60-89 mL/min/1 73 square meters)    Stage 3A Moderate CKD (GFR = 45-59 mL/min/1 73 square meters)    Stage 3B Moderate CKD (GFR = 30-44 mL/min/1 73 square meters)    Stage 4 Severe CKD (GFR = 15-29 mL/min/1 73 square meters)    Stage 5 End Stage CKD (GFR <15 mL/min/1 73 square meters)  Note: GFR calculation is accurate only with a steady state creatinine    Troponin I [[id number]]  (Normal) Collected:  01/23/20 2147    Lab Status:  Final result Specimen:  Blood from Arm, Left Updated:  01/23/20 2214     Troponin I <0 03 ng/mL     CBC and differential [[id number]]  (Normal) Collected:  01/23/20 2147    Lab Status:  Final result Specimen:  Blood from Arm, Left Updated:  01/23/20 2156     WBC 5 40 Thousand/uL      RBC 4 74 Million/uL      Hemoglobin 14 2 g/dL      Hematocrit 43 3 %      MCV 91 fL      MCH 30 0 pg      MCHC 32 9 g/dL      RDW 13 7 %      MPV 9 2 fL      Platelets 946 Thousands/uL      Neutrophils Relative 58 %      Lymphocytes Relative 32 %      Monocytes Relative 6 %      Eosinophils Relative 4 %      Basophils Relative 1 %      Neutrophils Absolute 3 10 Thousands/µL      Lymphocytes Absolute 1 70 Thousands/µL      Monocytes Absolute 0 30 Thousand/µL      Eosinophils Absolute 0 20 Thousand/µL      Basophils Absolute 0 00 Thousands/µL                  XR chest 2 views   Final Result by Jessica Barnes MD (01/24 9808)      No acute cardiopulmonary disease              Workstation performed: [id number]                    Procedures  Procedures         ED Course  ED Course as of Jan 29 2253 Fri Jan 24, 2020   0135 Sodium: 143   0135 Potassium: 3 6   0135 Chloride: 107   0135 CO2: 27   0135 Anion Gap: 9   0135 BUN: 12   0135 Creatinine: 1 00   0135 Glucose, Random: 99   0135 Calcium: 9 1   0135 AST: 16   0135 ALT: 16   0135 Alkaline Phosphatase: 75   0135 Total Protein: 7 1   0135 Albumin: 4 5   0135 Troponin I: <0 03   0135 WBC: 5 40   0135 Hemoglobin: 14 2   0135 HCT: 43 3   0135 MCV: 91   0135 MCH: 30 0   0135 Platelet Count: 754   0135 Neutrophils %: 58   0135 Lymphocytes Relative: 32   0135 Monocytes Relative: 6   0135 Eosinophils: 4   0217 Sodium: 143   0240 Pt stable   Awaiting repeat Troponin      0347 Troponin I: <0 03                               MDM      Disposition  Final diagnoses:   Chest pain, unspecified     Time reflects when diagnosis was documented in both MDM as applicable and the Disposition within this note     Time User Action Codes Description Comment    1/24/2020  3:47 AM Sarah Donovan Add [R07 9] Chest pain, unspecified       ED Disposition     ED Disposition Condition Date/Time Comment    Discharge Stable Fri Jan 24, 2020  3:47 AM Yann Padgett discharge to home/self care  Follow-up Information     Follow up With Specialties Details Why Madeline Argueta MD Family Medicine Call today  [address]      Danette Claire MD Cardiology Call today  [place] [phone]            Discharge Medication List as of 1/24/2020  3:48 AM      CONTINUE these medications which have NOT CHANGED    Details   levothyroxine 75 mcg tablet Take 75 mcg by mouth daily, Starting Fri 10/18/2019, Historical Med      predniSONE 10 mg tablet Take 3 tabs BID X 2 days, 2 tabs BID X 2 days, 1 tab BID X 2 days, 1 tab daily X 2 days, Normal           No discharge procedures on file      ED Provider  Electronically Signed by           Gera Jones MD  01/29/20 7680

## 2020-01-24 NOTE — DISCHARGE INSTRUCTIONS
RETURN IF WORSE IN ANY WAY: CHEST PAIN, SHORTNESS OF BREATH, INCREASED PAIN, FEVER OR FLU LIKE SYMPTOMS, OR NEW AND CONCERNING SYMPTOMS SIGNS OR SYMPTOMS:    PLEASE CALL Cardiology and YOUR PRIMARY DOCTOR IN THE MORNING TO SET UP FOLLOW UP   PLEASE REVIEW THE WORK UP RESULTS WITH YOUR DOCTOR

## 2020-01-27 ENCOUNTER — TRANSCRIBE ORDERS (OUTPATIENT)
Dept: ADMINISTRATIVE | Facility: HOSPITAL | Age: 40
End: 2020-01-27

## 2020-01-27 DIAGNOSIS — R07.9 CHEST PAIN, UNSPECIFIED TYPE: ICD-10-CM

## 2020-01-27 DIAGNOSIS — R06.02 SOB (SHORTNESS OF BREATH): Primary | ICD-10-CM

## 2020-01-27 DIAGNOSIS — R00.2 PALPITATIONS: ICD-10-CM

## 2020-01-30 NOTE — ED PROCEDURE NOTE
PROCEDURE  ECG 12 Lead Documentation Only  Date/Time: 1/23/2020 9:40 PM  Performed by: Francisco Colin MD  Authorized by: Francisco Colin MD     Indications / Diagnosis:  Cp  ECG reviewed by me, the ED Provider: yes    Patient location:  ED  Previous ECG:     Comparison to cardiac monitor: Yes    Interpretation:     Interpretation: normal    Rate:     ECG rate:  62    ECG rate assessment: normal    Rhythm:     Rhythm: sinus rhythm    QRS:     QRS axis:  Normal    QRS intervals:  Normal  Conduction:     Conduction: normal    ST segments:     ST segments:  Normal  T waves:     T waves: normal           Francisco Colin MD  01/30/20 1043

## 2020-02-11 ENCOUNTER — HOSPITAL ENCOUNTER (OUTPATIENT)
Dept: NON INVASIVE DIAGNOSTICS | Facility: CLINIC | Age: 40
Discharge: HOME | End: 2020-02-11
Payer: COMMERCIAL

## 2020-02-11 DIAGNOSIS — R06.02 SOB (SHORTNESS OF BREATH): ICD-10-CM

## 2020-02-11 DIAGNOSIS — R07.9 CHEST PAIN, UNSPECIFIED TYPE: ICD-10-CM

## 2020-02-11 DIAGNOSIS — R00.2 PALPITATIONS: ICD-10-CM

## 2020-02-11 PROCEDURE — 93016 CV STRESS TEST SUPVJ ONLY: CPT | Performed by: INTERNAL MEDICINE

## 2020-02-11 PROCEDURE — 93018 CV STRESS TEST I&R ONLY: CPT | Performed by: INTERNAL MEDICINE

## 2020-02-11 PROCEDURE — 93017 CV STRESS TEST TRACING ONLY: CPT

## 2020-02-12 LAB
CHEST PAIN STATEMENT: NORMAL
MAX DIASTOLIC BP: 76 MMHG
MAX HEART RATE: 162 BPM
MAX PREDICTED HEART RATE: 180 BPM
MAX. SYSTOLIC BP: 160 MMHG
PROTOCOL NAME: NORMAL
REASON FOR TERMINATION: NORMAL
TARGET HR FORMULA: NORMAL
TEST INDICATION: NORMAL
TIME IN EXERCISE PHASE: NORMAL

## 2020-10-27 ENCOUNTER — NURSE TRIAGE (OUTPATIENT)
Dept: OTHER | Facility: OTHER | Age: 40
End: 2020-10-27

## 2020-10-27 DIAGNOSIS — Z20.828 SARS-ASSOCIATED CORONAVIRUS EXPOSURE: ICD-10-CM

## 2020-10-27 DIAGNOSIS — Z20.828 SARS-ASSOCIATED CORONAVIRUS EXPOSURE: Primary | ICD-10-CM

## 2020-10-27 PROCEDURE — U0003 INFECTIOUS AGENT DETECTION BY NUCLEIC ACID (DNA OR RNA); SEVERE ACUTE RESPIRATORY SYNDROME CORONAVIRUS 2 (SARS-COV-2) (CORONAVIRUS DISEASE [COVID-19]), AMPLIFIED PROBE TECHNIQUE, MAKING USE OF HIGH THROUGHPUT TECHNOLOGIES AS DESCRIBED BY CMS-2020-01-R: HCPCS | Performed by: FAMILY MEDICINE

## 2020-10-29 LAB — SARS-COV-2 RNA SPEC QL NAA+PROBE: NOT DETECTED

## 2021-01-09 ENCOUNTER — OFFICE VISIT (OUTPATIENT)
Dept: URGENT CARE | Facility: CLINIC | Age: 41
End: 2021-01-09
Payer: COMMERCIAL

## 2021-01-09 VITALS — RESPIRATION RATE: 18 BRPM | TEMPERATURE: 99.8 F | HEART RATE: 78 BPM | OXYGEN SATURATION: 98 %

## 2021-01-09 DIAGNOSIS — R05.9 COUGH: Primary | ICD-10-CM

## 2021-01-09 DIAGNOSIS — Z20.822 EXPOSURE TO COVID-19 VIRUS: ICD-10-CM

## 2021-01-09 PROCEDURE — U0005 INFEC AGEN DETEC AMPLI PROBE: HCPCS

## 2021-01-09 PROCEDURE — G0382 LEV 3 HOSP TYPE B ED VISIT: HCPCS

## 2021-01-09 PROCEDURE — U0003 INFECTIOUS AGENT DETECTION BY NUCLEIC ACID (DNA OR RNA); SEVERE ACUTE RESPIRATORY SYNDROME CORONAVIRUS 2 (SARS-COV-2) (CORONAVIRUS DISEASE [COVID-19]), AMPLIFIED PROBE TECHNIQUE, MAKING USE OF HIGH THROUGHPUT TECHNOLOGIES AS DESCRIBED BY CMS-2020-01-R: HCPCS

## 2021-01-09 NOTE — PROGRESS NOTES
[address]        NAME: Fortino Perry is a 38 y o  male  : 1980    MRN: [id number]  DATE: 2021  TIME: 11:21 AM    Assessment and Plan   Cough [R05]  1  Cough  Novel Coronavirus (COVID-19), PCR [facility] - Office Collection   2  Exposure to COVID-19 virus           Patient Instructions       Follow up with PCP in 3-5 days  Proceed to  ER if symptoms worsen  Patient Instructions   CoVid swab performed in office today  Results to be reviewed and treatment plan adjusted as needed  Encouraged adequate hydration  Remained quarantined in home until results reviewed and if in the event positive, please contact PCP for further treatment discussion  Monitor for persistent or worsening symptoms including cough, shortness of breath and follow up with PCP as needed  Report to ER for any difficulty breathing         Chief Complaint     Chief Complaint   Patient presents with    Chills    Cough         History of Present Illness       Patient here today after exposure to COVID positive individual within past 10 days  Patient is experiencing cough, low-grade fever T-max 100 1°, body aches, chills, nasal congestion  Denies shortness of breath or chest pain at this time      Review of Systems   Review of Systems   Constitutional: Positive for chills and fever  Negative for activity change and appetite change  HENT: Positive for congestion  Negative for ear pain, sneezing and sore throat  Eyes: Negative for discharge and redness  Respiratory: Positive for cough  Negative for chest tightness and shortness of breath  Cardiovascular: Negative for chest pain  Gastrointestinal: Negative for abdominal pain, diarrhea, nausea and vomiting  Genitourinary: Negative for decreased urine volume  Musculoskeletal: Positive for myalgias (Generalized body aches)  Skin: Negative for rash  Allergic/Immunologic: Negative for environmental allergies     Neurological: Negative for dizziness, syncope and headaches  Hematological: Negative for adenopathy  Psychiatric/Behavioral: Negative for sleep disturbance  Current Medications       Current Outpatient Medications:     levothyroxine 75 mcg tablet, Take 75 mcg by mouth daily, Disp: , Rfl: 1    predniSONE 10 mg tablet, Take 3 tabs BID X 2 days, 2 tabs BID X 2 days, 1 tab BID X 2 days, 1 tab daily X 2 days (Patient not taking: Reported on 1/23/2020), Disp: 26 tablet, Rfl: 0    Current Allergies     Allergies as of 01/09/2021    (No Known Allergies)            The following portions of the patient's history were reviewed and updated as appropriate: allergies, current medications, past family history, past medical history, past social history, past surgical history and problem list      Past Medical History:   Diagnosis Date    Disease of thyroid gland        History reviewed  No pertinent surgical history  History reviewed  No pertinent family history  Medications have been verified  Objective   Pulse 78   Temp 99 8 °F (37 7 °C)   Resp 18   SpO2 98%   No LMP for male patient  Physical Exam     Physical Exam  Vitals signs reviewed  Constitutional:       General: He is not in acute distress  Appearance: Normal appearance  He is well-developed and well-groomed  He is not ill-appearing  HENT:      Head: Normocephalic  Right Ear: Tympanic membrane and ear canal normal       Left Ear: Tympanic membrane and ear canal normal       Nose: Congestion ( mild) present  Mouth/Throat:      Lips: Pink  Mouth: Mucous membranes are moist       Pharynx: Oropharynx is clear  Eyes:      General: Lids are normal          Right eye: No discharge  Left eye: No discharge  Cardiovascular:      Rate and Rhythm: Regular rhythm  Heart sounds: Normal heart sounds, S1 normal and S2 normal    Pulmonary:      Effort: Pulmonary effort is normal  No tachypnea or respiratory distress        Breath sounds: Normal breath sounds and air entry  No decreased air movement  No wheezing or rhonchi  Musculoskeletal: Normal range of motion  Lymphadenopathy:      Cervical: No cervical adenopathy  Skin:     General: Skin is warm and dry  Neurological:      Mental Status: He is alert  Psychiatric:         Attention and Perception: Attention normal          Mood and Affect: Mood normal          Speech: Speech normal          Behavior: Behavior is cooperative

## 2021-01-09 NOTE — PATIENT INSTRUCTIONS
CoVid swab performed in office today  Results to be reviewed and treatment plan adjusted as needed  Encouraged adequate hydration  Remained quarantined in home until results reviewed and if in the event positive, please contact PCP for further treatment discussion  Monitor for persistent or worsening symptoms including cough, shortness of breath and follow up with PCP as needed    Report to ER for any difficulty breathing

## 2021-01-09 NOTE — LETTER
January 9, 2021     Patient: Celine De Leon   YOB: 1980   Date of Visit: 1/9/2021       To Whom it May Concern:    Celine De Leon was seen in my clinic on 1/9/2021  He may return to work on review of negative CoVid swab performed on 1/9/2021  If you have any questions or concerns, please don't hesitate to call           Sincerely,          CAMERON ROONEY        CC: Celine De Leon

## 2021-01-11 LAB — SARS-COV-2 RNA SPEC QL NAA+PROBE: DETECTED

## 2021-01-16 ENCOUNTER — TELEPHONE (OUTPATIENT)
Dept: URGENT CARE | Facility: CLINIC | Age: 41
End: 2021-01-16

## 2021-04-08 DIAGNOSIS — Z23 ENCOUNTER FOR IMMUNIZATION: ICD-10-CM

## 2021-04-21 ENCOUNTER — IMMUNIZATIONS (OUTPATIENT)
Dept: FAMILY MEDICINE CLINIC | Facility: HOSPITAL | Age: 41
End: 2021-04-21

## 2021-04-21 DIAGNOSIS — Z23 ENCOUNTER FOR IMMUNIZATION: Primary | ICD-10-CM

## 2021-04-21 PROCEDURE — 91300 SARS-COV-2 / COVID-19 MRNA VACCINE (PFIZER-BIONTECH) 30 MCG: CPT

## 2021-04-21 PROCEDURE — 0001A SARS-COV-2 / COVID-19 MRNA VACCINE (PFIZER-BIONTECH) 30 MCG: CPT

## 2021-05-12 ENCOUNTER — IMMUNIZATIONS (OUTPATIENT)
Dept: FAMILY MEDICINE CLINIC | Facility: HOSPITAL | Age: 41
End: 2021-05-12

## 2021-05-12 DIAGNOSIS — Z23 ENCOUNTER FOR IMMUNIZATION: Primary | ICD-10-CM

## 2021-05-12 PROCEDURE — 0002A SARS-COV-2 / COVID-19 MRNA VACCINE (PFIZER-BIONTECH) 30 MCG: CPT

## 2021-05-12 PROCEDURE — 91300 SARS-COV-2 / COVID-19 MRNA VACCINE (PFIZER-BIONTECH) 30 MCG: CPT

## 2021-05-20 ENCOUNTER — APPOINTMENT (OUTPATIENT)
Dept: LAB | Facility: CLINIC | Age: 41
End: 2021-05-20
Payer: COMMERCIAL

## 2021-05-20 ENCOUNTER — TRANSCRIBE ORDERS (OUTPATIENT)
Dept: LAB | Facility: CLINIC | Age: 41
End: 2021-05-20

## 2021-05-20 DIAGNOSIS — E03.9 HYPOTHYROIDISM, UNSPECIFIED TYPE: Primary | ICD-10-CM

## 2021-05-20 DIAGNOSIS — R35.1 NOCTURIA: ICD-10-CM

## 2021-05-20 LAB
25(OH)D3 SERPL-MCNC: 29.4 NG/ML (ref 30–100)
ALBUMIN SERPL BCP-MCNC: 4.4 G/DL (ref 3.5–5)
ALP SERPL-CCNC: 77 U/L (ref 46–116)
ALT SERPL W P-5'-P-CCNC: 27 U/L (ref 12–78)
ANION GAP SERPL CALCULATED.3IONS-SCNC: 5 MMOL/L (ref 4–13)
AST SERPL W P-5'-P-CCNC: 23 U/L (ref 5–45)
BILIRUB SERPL-MCNC: 0.56 MG/DL (ref 0.2–1)
BUN SERPL-MCNC: 12 MG/DL (ref 5–25)
CALCIUM SERPL-MCNC: 9.8 MG/DL (ref 8.3–10.1)
CHLORIDE SERPL-SCNC: 108 MMOL/L (ref 100–108)
CHOLEST SERPL-MCNC: 167 MG/DL (ref 50–200)
CO2 SERPL-SCNC: 29 MMOL/L (ref 21–32)
CREAT SERPL-MCNC: 1.17 MG/DL (ref 0.6–1.3)
ERYTHROCYTE [DISTWIDTH] IN BLOOD BY AUTOMATED COUNT: 13.1 % (ref 11.6–15.1)
GFR SERPL CREATININE-BSD FRML MDRD: 77 ML/MIN/1.73SQ M
GLUCOSE P FAST SERPL-MCNC: 84 MG/DL (ref 65–99)
HCT VFR BLD AUTO: 43.6 % (ref 36.5–49.3)
HDLC SERPL-MCNC: 52 MG/DL
HGB BLD-MCNC: 14.3 G/DL (ref 12–17)
LDLC SERPL CALC-MCNC: 100 MG/DL (ref 0–100)
MCH RBC QN AUTO: 29.3 PG (ref 26.8–34.3)
MCHC RBC AUTO-ENTMCNC: 32.8 G/DL (ref 31.4–37.4)
MCV RBC AUTO: 89 FL (ref 82–98)
NONHDLC SERPL-MCNC: 115 MG/DL
PLATELET # BLD AUTO: 205 THOUSANDS/UL (ref 149–390)
PMV BLD AUTO: 11.8 FL (ref 8.9–12.7)
POTASSIUM SERPL-SCNC: 3.8 MMOL/L (ref 3.5–5.3)
PROT SERPL-MCNC: 7.5 G/DL (ref 6.4–8.2)
PSA SERPL-MCNC: 0.7 NG/ML (ref 0–4)
RBC # BLD AUTO: 4.88 MILLION/UL (ref 3.88–5.62)
SODIUM SERPL-SCNC: 142 MMOL/L (ref 136–145)
T4 FREE SERPL-MCNC: 0.9 NG/DL (ref 0.76–1.46)
TRIGL SERPL-MCNC: 73 MG/DL
TSH SERPL DL<=0.05 MIU/L-ACNC: 12.6 UIU/ML (ref 0.36–3.74)
WBC # BLD AUTO: 4.54 THOUSAND/UL (ref 4.31–10.16)

## 2021-05-20 PROCEDURE — 36415 COLL VENOUS BLD VENIPUNCTURE: CPT

## 2021-05-20 PROCEDURE — 82306 VITAMIN D 25 HYDROXY: CPT

## 2021-05-20 PROCEDURE — 84439 ASSAY OF FREE THYROXINE: CPT

## 2021-05-20 PROCEDURE — 84443 ASSAY THYROID STIM HORMONE: CPT

## 2021-05-20 PROCEDURE — 85027 COMPLETE CBC AUTOMATED: CPT

## 2021-05-20 PROCEDURE — 80061 LIPID PANEL: CPT

## 2021-05-20 PROCEDURE — G0103 PSA SCREENING: HCPCS

## 2021-05-20 PROCEDURE — 80053 COMPREHEN METABOLIC PANEL: CPT

## 2021-11-03 ENCOUNTER — APPOINTMENT (OUTPATIENT)
Dept: LAB | Facility: MEDICAL CENTER | Age: 41
End: 2021-11-03
Payer: COMMERCIAL

## 2021-11-03 DIAGNOSIS — E03.9 HYPOTHYROIDISM, UNSPECIFIED TYPE: ICD-10-CM

## 2021-11-03 DIAGNOSIS — R63.4 WEIGHT LOSS: ICD-10-CM

## 2021-11-03 DIAGNOSIS — R41.3 MEMORY LOSS: ICD-10-CM

## 2021-11-03 LAB
ALBUMIN SERPL BCP-MCNC: 4.1 G/DL (ref 3.5–5)
ALP SERPL-CCNC: 74 U/L (ref 46–116)
ALT SERPL W P-5'-P-CCNC: 24 U/L (ref 12–78)
ANION GAP SERPL CALCULATED.3IONS-SCNC: 5 MMOL/L (ref 4–13)
AST SERPL W P-5'-P-CCNC: 15 U/L (ref 5–45)
BASOPHILS # BLD AUTO: 0.03 THOUSANDS/ΜL (ref 0–0.1)
BASOPHILS NFR BLD AUTO: 1 % (ref 0–1)
BILIRUB SERPL-MCNC: 0.99 MG/DL (ref 0.2–1)
BUN SERPL-MCNC: 12 MG/DL (ref 5–25)
CALCIUM SERPL-MCNC: 9.4 MG/DL (ref 8.3–10.1)
CHLORIDE SERPL-SCNC: 106 MMOL/L (ref 100–108)
CO2 SERPL-SCNC: 27 MMOL/L (ref 21–32)
CREAT SERPL-MCNC: 1.04 MG/DL (ref 0.6–1.3)
EOSINOPHIL # BLD AUTO: 0.04 THOUSAND/ΜL (ref 0–0.61)
EOSINOPHIL NFR BLD AUTO: 1 % (ref 0–6)
ERYTHROCYTE [DISTWIDTH] IN BLOOD BY AUTOMATED COUNT: 13.3 % (ref 11.6–15.1)
FERRITIN SERPL-MCNC: 151 NG/ML (ref 8–388)
GFR SERPL CREATININE-BSD FRML MDRD: 89 ML/MIN/1.73SQ M
GLUCOSE SERPL-MCNC: 75 MG/DL (ref 65–140)
HCT VFR BLD AUTO: 42.2 % (ref 36.5–49.3)
HGB BLD-MCNC: 14 G/DL (ref 12–17)
IMM GRANULOCYTES # BLD AUTO: 0.01 THOUSAND/UL (ref 0–0.2)
IMM GRANULOCYTES NFR BLD AUTO: 0 % (ref 0–2)
LYMPHOCYTES # BLD AUTO: 1.74 THOUSANDS/ΜL (ref 0.6–4.47)
LYMPHOCYTES NFR BLD AUTO: 32 % (ref 14–44)
MCH RBC QN AUTO: 29.6 PG (ref 26.8–34.3)
MCHC RBC AUTO-ENTMCNC: 33.2 G/DL (ref 31.4–37.4)
MCV RBC AUTO: 89 FL (ref 82–98)
MONOCYTES # BLD AUTO: 0.31 THOUSAND/ΜL (ref 0.17–1.22)
MONOCYTES NFR BLD AUTO: 6 % (ref 4–12)
NEUTROPHILS # BLD AUTO: 3.26 THOUSANDS/ΜL (ref 1.85–7.62)
NEUTS SEG NFR BLD AUTO: 60 % (ref 43–75)
NRBC BLD AUTO-RTO: 0 /100 WBCS
PLATELET # BLD AUTO: 197 THOUSANDS/UL (ref 149–390)
PMV BLD AUTO: 11.8 FL (ref 8.9–12.7)
POTASSIUM SERPL-SCNC: 3.6 MMOL/L (ref 3.5–5.3)
PROT SERPL-MCNC: 7.3 G/DL (ref 6.4–8.2)
RBC # BLD AUTO: 4.73 MILLION/UL (ref 3.88–5.62)
SODIUM SERPL-SCNC: 138 MMOL/L (ref 136–145)
T4 FREE SERPL-MCNC: 1 NG/DL (ref 0.76–1.46)
TSH SERPL DL<=0.05 MIU/L-ACNC: 13.3 UIU/ML (ref 0.36–3.74)
VIT B12 SERPL-MCNC: 646 PG/ML (ref 100–900)
WBC # BLD AUTO: 5.39 THOUSAND/UL (ref 4.31–10.16)

## 2021-11-03 PROCEDURE — 80053 COMPREHEN METABOLIC PANEL: CPT

## 2021-11-03 PROCEDURE — 82607 VITAMIN B-12: CPT

## 2021-11-03 PROCEDURE — 82728 ASSAY OF FERRITIN: CPT

## 2021-11-03 PROCEDURE — 85025 COMPLETE CBC W/AUTO DIFF WBC: CPT

## 2021-11-03 PROCEDURE — 84439 ASSAY OF FREE THYROXINE: CPT

## 2021-11-03 PROCEDURE — 86618 LYME DISEASE ANTIBODY: CPT

## 2021-11-03 PROCEDURE — 84443 ASSAY THYROID STIM HORMONE: CPT

## 2021-11-03 PROCEDURE — 36415 COLL VENOUS BLD VENIPUNCTURE: CPT

## 2021-11-04 LAB — B BURGDOR IGG+IGM SER-ACNC: 5

## 2021-12-03 PROCEDURE — U0003 INFECTIOUS AGENT DETECTION BY NUCLEIC ACID (DNA OR RNA); SEVERE ACUTE RESPIRATORY SYNDROME CORONAVIRUS 2 (SARS-COV-2) (CORONAVIRUS DISEASE [COVID-19]), AMPLIFIED PROBE TECHNIQUE, MAKING USE OF HIGH THROUGHPUT TECHNOLOGIES AS DESCRIBED BY CMS-2020-01-R: HCPCS | Performed by: NURSE PRACTITIONER

## 2021-12-03 PROCEDURE — U0005 INFEC AGEN DETEC AMPLI PROBE: HCPCS | Performed by: NURSE PRACTITIONER

## 2022-04-14 ENCOUNTER — APPOINTMENT (OUTPATIENT)
Dept: RADIOLOGY | Facility: CLINIC | Age: 42
End: 2022-04-14
Payer: COMMERCIAL

## 2022-04-14 DIAGNOSIS — R05.3 PERSISTENT COUGH: ICD-10-CM

## 2022-04-14 PROCEDURE — 71046 X-RAY EXAM CHEST 2 VIEWS: CPT

## 2024-01-04 ENCOUNTER — OFFICE VISIT (OUTPATIENT)
Dept: URGENT CARE | Facility: CLINIC | Age: 44
End: 2024-01-04
Payer: COMMERCIAL

## 2024-01-04 VITALS
RESPIRATION RATE: 20 BRPM | HEIGHT: 69 IN | WEIGHT: 161.2 LBS | DIASTOLIC BLOOD PRESSURE: 80 MMHG | TEMPERATURE: 99 F | OXYGEN SATURATION: 99 % | SYSTOLIC BLOOD PRESSURE: 120 MMHG | BODY MASS INDEX: 23.88 KG/M2 | HEART RATE: 60 BPM

## 2024-01-04 DIAGNOSIS — U07.1 COVID-19: Primary | ICD-10-CM

## 2024-01-04 LAB
S PYO AG THROAT QL: NEGATIVE
SARS-COV-2 AG UPPER RESP QL IA: POSITIVE
VALID CONTROL: ABNORMAL

## 2024-01-04 PROCEDURE — 87811 SARS-COV-2 COVID19 W/OPTIC: CPT | Performed by: NURSE PRACTITIONER

## 2024-01-04 PROCEDURE — 87880 STREP A ASSAY W/OPTIC: CPT | Performed by: NURSE PRACTITIONER

## 2024-01-04 PROCEDURE — 99213 OFFICE O/P EST LOW 20 MIN: CPT | Performed by: NURSE PRACTITIONER

## 2024-01-04 NOTE — PROGRESS NOTES
[facility]        NAME: Sada Rhodes is a 43 y.o. male  : 1980    MRN: [id number]  DATE: 2024  TIME: 6:51 PM      Assessment and Plan     COVID-19 [U07.1]  1. COVID-19  POCT rapid ANTIGEN strepA    Poct Covid 19 Rapid Antigen Test            Patient Instructions     Patient Instructions   COVID-19 (Coronavirus Disease 2019)   AMBULATORY CARE:   What you need to know about COVID-19:  COVID-19 is the disease caused by a coronavirus first discovered in 2019. The virus can be spread starting 2 to 3 days before symptoms begin. The virus has changed into several new forms (called variants) since it was discovered. A variant may be more easily spread or cause more severe illness than the original form.  Signs and symptoms of COVID-19  Signs and symptoms usually start about 5 days after infection but can take 2 to 14 days. You may feel like you have the flu or a bad cold. Some signs and symptoms go away in a few days. Others can last weeks, months, or possibly years. You may have any of the following:  A cough or sore throat    Shortness of breath or trouble breathing that may become severe    A fever, possibly with chills and shaking    Muscle pain, body aches, or a headache    Sudden changes or loss of your taste or smell    Feeling mentally and physically tired (fatigue)    Congestion (stuffy head and nose), or a runny nose    Diarrhea, nausea, or vomiting    Call your local emergency number (911 in the [place]) if:   You have trouble breathing or shortness of breath at rest.    You have chest pain or pressure that lasts longer than 5 minutes.    You become confused or hard to wake.    Seek care immediately if:   The skin on your face, fingers, or toes look blue or darker than usual.      Call your doctor if:   You have a fever.    You have questions or concerns about your condition or care.    How COVID-19 is diagnosed:  Your healthcare provider will examine you and ask about your  symptoms. Tell your provider if you have any health conditions or are taking any medicines. Your provider can help you create a COVID-19 plan if you are at high risk for severe illness. Any of the following tests may be used:  A viral test  shows if you have a current infection. Some tests are available for you to do at home. Most use a nasal swab and give the result within 15 minutes. Your provider may want you to keep a supply of tests at home as part of your COVID-19 plan. Testing sites may also be available. A sample is taken from your nose or throat with a swab. You may need to quarantine until you get the results from a testing site.    An antigen test  shows if you have a protein from the COVID-19 virus. This test is often called a rapid test because the results are available in 30 minutes or less.    An antibody test  shows if you had a recent or past infection. Blood samples are used for this test. Antibodies are made by your immune system to fight the virus that causes COVID-19. Antibodies form 1 to 3 weeks after you are infected. This test is not used to show if you are immune to the virus.    A CT, MRI, ultrasound, or x-ray  may show complications of COVID-19, such as pneumonia or blood clots.    Treatment  may include any of the following:  Mild symptoms  may get better on their own. Some treatments have emergency use authorization (EUA). Examples include monoclonal antibodies and convalescent plasma. These may be given to help prevent worsening of your symptoms. You may also need any of the following:    Decongestants  help reduce nasal congestion and help you breathe more easily. If you take decongestant pills, they may make you feel restless or cause problems with your sleep. Do not use decongestant sprays for more than a few days.    Cough suppressants  help reduce coughing. Ask your healthcare provider which type of cough medicine is best for you.    To soothe a sore throat,  gargle with warm salt  water, or use throat lozenges or a throat spray. Drink more liquids to thin and loosen mucus and to prevent dehydration.    NSAIDs or acetaminophen  can help lower a fever and relieve body aches or a headache. Follow directions. If not taken correctly, NSAIDs can cause kidney damage and acetaminophen can cause liver damage.    Severe or life-threatening symptoms  are treated in the hospital. You may need any of the following:     Medicines  may be given to fight the virus or treat inflammation.    Blood thinners  help prevent or treat blood clots. If you have a deep vein thrombosis (DVT) or pulmonary embolism (PE), you may need to use blood thinners for at least 3 months.    Extra oxygen  may be given if you have respiratory failure. This means your lungs cannot get enough oxygen into your blood and out to your organs.    A ventilator  may be used to help you breathe.    What you need to know about long COVID:  Long COVID is a term to describe health problems the virus may cause.  Certain conditions increase your risk for long COVID.  Your risk is higher if you are 65 or older or a current or former smoker. A weak immune system, obesity, diabetes, or kidney, heart, or lung disease can also increase your risk.    Long COVID may cause severe or chronic health problems.  The most common problem is trouble thinking, remembering, or concentrating. You may also develop shortness of breath or a serious respiratory condition such as pneumonia. Blood clots may form and lead to organ damage. You may have nerve pain, trouble sleeping, or mood changes.    COVID-19 can lead to severe inflammation in your organs.  This is also called multisymptom inflammatory syndrome in adults (MIS-A) or MIS-C in children. Inflammation may affect the heart, digestive system, skin, or brain.    What you need to know about COVID-19 vaccines:  Healthcare providers recommend vaccination, even if you already had COVID-19.  Get a COVID-19 vaccine as  directed.  Vaccination is recommended for everyone 6 months or older. COVID-19 vaccines are given as a shot in 1 to 3 doses as a primary series. This depends on the vaccine brand and the age of the person who receives it. A booster dose is recommended for everyone 5 years or older after the primary series is complete. A second booster  is recommended for all adults 50 or older and for immunocompromised adolescents. The second booster is also recommended for anyone who got the 1-dose brand of vaccine for the first dose and a booster. Your provider can give you more information on boosters and help you schedule all needed doses.         Continue to protect yourself and others.  You can become infected even after you get the vaccine. You may also be able to pass the virus to others without knowing you are infected.    After you get the vaccine, check local, national, and international travel rules.  You may need to be tested before you travel. Some countries require proof of a negative test before you travel. You may also need to quarantine after you return.    Medicine may be given to prevent infection.  The medicine can be given if you are at high risk for infection and cannot get the vaccine. It can also be given if your immune system does not respond well to the vaccine.    How the 2019 coronavirus spreads:  Close personal contact with an infected person increases your risk for infection. This means being within 6 feet (2 meters) of the person for at least 15 minutes over 24 hours.  The virus travels in droplets that form when a person talks, sings, coughs, or sneezes.  The droplets can also float in the air for minutes or hours. Infection happens when you breathe in the droplets or get them in your eyes or nose.    Person-to-person contact can spread the virus.  For example, a person with the virus on his or her hands can spread it by shaking hands with someone.    The virus can stay on objects and surfaces for  hours to days.  You may become infected by touching the object or surface and then touching your eyes or mouth.    Help lower your risk for COVID-19 during an active outbreak:   Stay home if you are sick or think you may have COVID-19.  It is important to stay home if you are waiting for a testing appointment or for test results.    Wash your hands often throughout the day.  Use soap and water. Rub your soapy hands together, lacing your fingers, for at least 20 seconds. Rinse with warm, running water. Dry your hands with a clean towel or paper towel. Use hand  that contains alcohol if soap and water are not available. Teach children how to wash their hands and use hand .         Cover sneezes and coughs.  Turn your face away and cover your mouth and nose with a tissue. Throw the tissue away. Use the bend of your arm if a tissue is not available. Then wash your hands well with soap and water or use hand . Teach children how to cover a cough or sneeze.    Wear a face covering (mask) when needed.  Use a cloth covering with at least 2 layers. You can also create layers by putting a cloth covering over a disposable non-medical mask. Cover your mouth and your nose.         Try to keep space between you and others when you are out of the house.  Avoid crowds as much as possible. Wear a face covering when you must be around a large group and cannot keep space between you and others.    Clean and disinfect high-touch surfaces and objects often.  Use disinfecting wipes, or make a solution of 4 teaspoons of bleach in 1 quart (4 cups) of water.    Ask about other vaccines you may need.  Get the influenza (flu) vaccine as soon as recommended each year, usually starting in September or October. Get the pneumonia vaccine if recommended. Your healthcare provider can tell you if you should also get other vaccines, and when to get them.       Follow up with your doctor as directed:  Write down your  questions so you remember to ask them during your visits.  For more information:   [employer] [employer] Disease Control and Prevention  [address] [place] [address]  Phone: 1- 517 - 213-0390  Web Address: http://www.[facility].gov    © Copyright [employer] 2023 Information is for End User's use only and may not be sold, redistributed or otherwise used for commercial purposes.  The above information is an  only. It is not intended as medical advice for individual conditions or treatments. Talk to your doctor, nurse or pharmacist before following any medical regimen to see if it is safe and effective for you.      Follow up with PCP in 3-5 days.  Proceed to  ER if symptoms worsen.    Chief Complaint     Chief Complaint   Patient presents with    Cough     Non productive for 2 days    Sore Throat     For 2 days          History of Present Illness     Onset of s/s illness on Tues.  A coworker at his second job tested positive around [place].  However, there are multiple sick call outs in the last few days--4 staff were sick just yesterday.    Cough  Associated symptoms include rhinorrhea (today), a sore throat and shortness of breath (after coughing).   Sore Throat   Associated symptoms include coughing and shortness of breath (after coughing).       Review of Systems     Review of Systems   HENT:  Positive for rhinorrhea (today), sneezing (started today) and sore throat.    Respiratory:  Positive for cough, chest tightness (after coughing) and shortness of breath (after coughing).         No hx asthma, smoking or rescue inhaler use   Neurological:  Positive for light-headedness (mild/brief after coughing).   All other systems reviewed and are negative.        Current Medications     No current outpatient medications on file.    Current Allergies     Allergies as of 01/04/2024    (No Known Allergies)              The following portions of the patient's history were reviewed and updated as appropriate: allergies,  "current medications, past family history, past medical history, past social history, past surgical history and problem list.     Past Medical History:   Diagnosis Date    Disease of thyroid gland        History reviewed. No pertinent surgical history.    History reviewed. No pertinent family history.      Medications have been verified.        Objective     /80   Pulse 60   Temp 99 °F (37.2 °C)   Resp 20   Ht 5' 9\" (1.753 m)   Wt 73.1 kg (161 lb 3.2 oz)   SpO2 99%   BMI 23.81 kg/m²   No LMP for male patient.         Physical Exam     Physical Exam  Vitals and nursing note reviewed.   Constitutional:       General: He is not in acute distress.     Appearance: Normal appearance. He is well-developed. He is not toxic-appearing or diaphoretic.   HENT:      Head: Normocephalic and atraumatic.      Nose: Mucosal edema and congestion (mild) present.      Mouth/Throat:      Mouth: Mucous membranes are moist.      Pharynx: Oropharynx is clear. Uvula midline. No oropharyngeal exudate or posterior oropharyngeal erythema.   Eyes:      Pupils: Pupils are equal, round, and reactive to light.   Cardiovascular:      Rate and Rhythm: Normal rate and regular rhythm. No extrasystoles are present.     Heart sounds: Normal heart sounds, S1 normal and S2 normal. No murmur heard.     No friction rub. No gallop.   Pulmonary:      Effort: Pulmonary effort is normal. No tachypnea, bradypnea, accessory muscle usage, prolonged expiration, respiratory distress or retractions.      Breath sounds: Normal breath sounds and air entry. No stridor or decreased air movement. No decreased breath sounds, wheezing, rhonchi or rales.   Chest:      Chest wall: No tenderness.   Abdominal:      General: Bowel sounds are normal. There is no distension.      Palpations: Abdomen is soft.      Tenderness: There is no abdominal tenderness.   Musculoskeletal:         General: Normal range of motion.      Cervical back: Normal range of motion and neck " supple.   Skin:     General: Skin is warm and dry.      Capillary Refill: Capillary refill takes less than 2 seconds.   Neurological:      General: No focal deficit present.      Mental Status: He is alert and oriented to person, place, and time.   Psychiatric:         Mood and Affect: Mood normal.         Behavior: Behavior normal.         Thought Content: Thought content normal.         Judgment: Judgment normal.

## 2024-01-04 NOTE — LETTER
January 4, 2024     Patient: Sada Rhodes   YOB: 1980   Date of Visit: 1/4/2024       To Whom It May Concern:    It is my medical opinion that Sada Rhodes  is positive for covid with first full day of symptoms on Tuesday.  He may return per company policy when also fever free for 24 hours and symptoms improving.  CDC recommends 5 days of isolation although each company is allowed to set their own policy .    If you have any questions or concerns, please don't hesitate to call.         Sincerely,        OMARI Cuello    CC: No Recipients

## 2024-01-04 NOTE — PATIENT INSTRUCTIONS
COVID-19 (Coronavirus Disease 2019)   AMBULATORY CARE:   What you need to know about COVID-19:  COVID-19 is the disease caused by a coronavirus first discovered in December 2019. The virus can be spread starting 2 to 3 days before symptoms begin. The virus has changed into several new forms (called variants) since it was discovered. A variant may be more easily spread or cause more severe illness than the original form.  Signs and symptoms of COVID-19  Signs and symptoms usually start about 5 days after infection but can take 2 to 14 days. You may feel like you have the flu or a bad cold. Some signs and symptoms go away in a few days. Others can last weeks, months, or possibly years. You may have any of the following:  A cough or sore throat    Shortness of breath or trouble breathing that may become severe    A fever, possibly with chills and shaking    Muscle pain, body aches, or a headache    Sudden changes or loss of your taste or smell    Feeling mentally and physically tired (fatigue)    Congestion (stuffy head and nose), or a runny nose    Diarrhea, nausea, or vomiting    Call your local emergency number (911 in the ) if:   You have trouble breathing or shortness of breath at rest.    You have chest pain or pressure that lasts longer than 5 minutes.    You become confused or hard to wake.    Seek care immediately if:   The skin on your face, fingers, or toes look blue or darker than usual.      Call your doctor if:   You have a fever.    You have questions or concerns about your condition or care.    How COVID-19 is diagnosed:  Your healthcare provider will examine you and ask about your symptoms. Tell your provider if you have any health conditions or are taking any medicines. Your provider can help you create a COVID-19 plan if you are at high risk for severe illness. Any of the following tests may be used:  A viral test  shows if you have a current infection. Some tests are available for you to do at  home. Most use a nasal swab and give the result within 15 minutes. Your provider may want you to keep a supply of tests at home as part of your COVID-19 plan. Testing sites may also be available. A sample is taken from your nose or throat with a swab. You may need to quarantine until you get the results from a testing site.    An antigen test  shows if you have a protein from the COVID-19 virus. This test is often called a rapid test because the results are available in 30 minutes or less.    An antibody test  shows if you had a recent or past infection. Blood samples are used for this test. Antibodies are made by your immune system to fight the virus that causes COVID-19. Antibodies form 1 to 3 weeks after you are infected. This test is not used to show if you are immune to the virus.    A CT, MRI, ultrasound, or x-ray  may show complications of COVID-19, such as pneumonia or blood clots.    Treatment  may include any of the following:  Mild symptoms  may get better on their own. Some treatments have emergency use authorization (EUA). Examples include monoclonal antibodies and convalescent plasma. These may be given to help prevent worsening of your symptoms. You may also need any of the following:    Decongestants  help reduce nasal congestion and help you breathe more easily. If you take decongestant pills, they may make you feel restless or cause problems with your sleep. Do not use decongestant sprays for more than a few days.    Cough suppressants  help reduce coughing. Ask your healthcare provider which type of cough medicine is best for you.    To soothe a sore throat,  gargle with warm salt water, or use throat lozenges or a throat spray. Drink more liquids to thin and loosen mucus and to prevent dehydration.    NSAIDs or acetaminophen  can help lower a fever and relieve body aches or a headache. Follow directions. If not taken correctly, NSAIDs can cause kidney damage and acetaminophen can cause liver  damage.    Severe or life-threatening symptoms  are treated in the hospital. You may need any of the following:     Medicines  may be given to fight the virus or treat inflammation.    Blood thinners  help prevent or treat blood clots. If you have a deep vein thrombosis (DVT) or pulmonary embolism (PE), you may need to use blood thinners for at least 3 months.    Extra oxygen  may be given if you have respiratory failure. This means your lungs cannot get enough oxygen into your blood and out to your organs.    A ventilator  may be used to help you breathe.    What you need to know about long COVID:  Long COVID is a term to describe health problems the virus may cause.  Certain conditions increase your risk for long COVID.  Your risk is higher if you are 65 or older or a current or former smoker. A weak immune system, obesity, diabetes, or kidney, heart, or lung disease can also increase your risk.    Long COVID may cause severe or chronic health problems.  The most common problem is trouble thinking, remembering, or concentrating. You may also develop shortness of breath or a serious respiratory condition such as pneumonia. Blood clots may form and lead to organ damage. You may have nerve pain, trouble sleeping, or mood changes.    COVID-19 can lead to severe inflammation in your organs.  This is also called multisymptom inflammatory syndrome in adults (MIS-A) or MIS-C in children. Inflammation may affect the heart, digestive system, skin, or brain.    What you need to know about COVID-19 vaccines:  Healthcare providers recommend vaccination, even if you already had COVID-19.  Get a COVID-19 vaccine as directed.  Vaccination is recommended for everyone 6 months or older. COVID-19 vaccines are given as a shot in 1 to 3 doses as a primary series. This depends on the vaccine brand and the age of the person who receives it. A booster dose is recommended for everyone 5 years or older after the primary series is complete.  A second booster  is recommended for all adults 50 or older and for immunocompromised adolescents. The second booster is also recommended for anyone who got the 1-dose brand of vaccine for the first dose and a booster. Your provider can give you more information on boosters and help you schedule all needed doses.         Continue to protect yourself and others.  You can become infected even after you get the vaccine. You may also be able to pass the virus to others without knowing you are infected.    After you get the vaccine, check local, national, and international travel rules.  You may need to be tested before you travel. Some countries require proof of a negative test before you travel. You may also need to quarantine after you return.    Medicine may be given to prevent infection.  The medicine can be given if you are at high risk for infection and cannot get the vaccine. It can also be given if your immune system does not respond well to the vaccine.    How the 2019 coronavirus spreads:  Close personal contact with an infected person increases your risk for infection. This means being within 6 feet (2 meters) of the person for at least 15 minutes over 24 hours.  The virus travels in droplets that form when a person talks, sings, coughs, or sneezes.  The droplets can also float in the air for minutes or hours. Infection happens when you breathe in the droplets or get them in your eyes or nose.    Person-to-person contact can spread the virus.  For example, a person with the virus on his or her hands can spread it by shaking hands with someone.    The virus can stay on objects and surfaces for hours to days.  You may become infected by touching the object or surface and then touching your eyes or mouth.    Help lower your risk for COVID-19 during an active outbreak:   Stay home if you are sick or think you may have COVID-19.  It is important to stay home if you are waiting for a testing appointment or for test  results.    Wash your hands often throughout the day.  Use soap and water. Rub your soapy hands together, lacing your fingers, for at least 20 seconds. Rinse with warm, running water. Dry your hands with a clean towel or paper towel. Use hand  that contains alcohol if soap and water are not available. Teach children how to wash their hands and use hand .         Cover sneezes and coughs.  Turn your face away and cover your mouth and nose with a tissue. Throw the tissue away. Use the bend of your arm if a tissue is not available. Then wash your hands well with soap and water or use hand . Teach children how to cover a cough or sneeze.    Wear a face covering (mask) when needed.  Use a cloth covering with at least 2 layers. You can also create layers by putting a cloth covering over a disposable non-medical mask. Cover your mouth and your nose.         Try to keep space between you and others when you are out of the house.  Avoid crowds as much as possible. Wear a face covering when you must be around a large group and cannot keep space between you and others.    Clean and disinfect high-touch surfaces and objects often.  Use disinfecting wipes, or make a solution of 4 teaspoons of bleach in 1 quart (4 cups) of water.    Ask about other vaccines you may need.  Get the influenza (flu) vaccine as soon as recommended each year, usually starting in September or October. Get the pneumonia vaccine if recommended. Your healthcare provider can tell you if you should also get other vaccines, and when to get them.       Follow up with your doctor as directed:  Write down your questions so you remember to ask them during your visits.  For more information:   Centers for Disease Control and Prevention  [address] [place] [address]  Phone: 2- 903 - 419-1452  Web Address: http://www.cdc.gov    © Copyright [employer]ative 2023 Information is for End User's use only and may not be sold, redistributed or  otherwise used for commercial purposes.  The above information is an  only. It is not intended as medical advice for individual conditions or treatments. Talk to your doctor, nurse or pharmacist before following any medical regimen to see if it is safe and effective for you.

## 2024-12-02 ENCOUNTER — TELEPHONE (OUTPATIENT)
Age: 44
End: 2024-12-02

## 2024-12-02 NOTE — TELEPHONE ENCOUNTER
Pt called in wanting to transfer care to [facility].     Pt wished to transfer to: Mahi Pacheco    Please remove current PCP from PCP field.  Thank you!

## 2025-01-27 ENCOUNTER — OFFICE VISIT (OUTPATIENT)
Dept: FAMILY MEDICINE CLINIC | Facility: CLINIC | Age: 45
End: 2025-01-27
Payer: COMMERCIAL

## 2025-01-27 ENCOUNTER — TELEPHONE (OUTPATIENT)
Dept: ADMINISTRATIVE | Facility: OTHER | Age: 45
End: 2025-01-27

## 2025-01-27 VITALS
HEIGHT: 69 IN | WEIGHT: 152 LBS | DIASTOLIC BLOOD PRESSURE: 70 MMHG | OXYGEN SATURATION: 99 % | SYSTOLIC BLOOD PRESSURE: 118 MMHG | HEART RATE: 53 BPM | BODY MASS INDEX: 22.51 KG/M2

## 2025-01-27 DIAGNOSIS — R41.840 ATTENTION DEFICIT: ICD-10-CM

## 2025-01-27 DIAGNOSIS — Z23 ENCOUNTER FOR IMMUNIZATION: ICD-10-CM

## 2025-01-27 DIAGNOSIS — E29.1 TESTICULAR HYPOFUNCTION: ICD-10-CM

## 2025-01-27 DIAGNOSIS — E06.3 HASHIMOTO'S THYROIDITIS: ICD-10-CM

## 2025-01-27 DIAGNOSIS — Z12.12 ENCOUNTER FOR COLORECTAL CANCER SCREENING: ICD-10-CM

## 2025-01-27 DIAGNOSIS — N46.01 INFERTILITY DUE TO AZOOSPERMIA: ICD-10-CM

## 2025-01-27 DIAGNOSIS — Z12.5 SCREENING FOR PROSTATE CANCER: ICD-10-CM

## 2025-01-27 DIAGNOSIS — Z12.11 ENCOUNTER FOR COLORECTAL CANCER SCREENING: ICD-10-CM

## 2025-01-27 DIAGNOSIS — Z00.00 ANNUAL PHYSICAL EXAM: Primary | ICD-10-CM

## 2025-01-27 PROBLEM — Q66.50 CONGENITAL PES PLANUS: Status: RESOLVED | Noted: 2019-04-30 | Resolved: 2025-01-27

## 2025-01-27 PROBLEM — M25.549 HAND JOINT PAIN: Status: RESOLVED | Noted: 2020-01-13 | Resolved: 2025-01-27

## 2025-01-27 PROBLEM — M17.9 OSTEOARTHRITIS OF KNEE: Status: ACTIVE | Noted: 2019-06-10

## 2025-01-27 PROBLEM — M25.561 PAIN IN RIGHT KNEE: Status: RESOLVED | Noted: 2019-04-30 | Resolved: 2025-01-27

## 2025-01-27 PROBLEM — M23.50 CHRONIC INSTABILITY OF KNEE: Status: RESOLVED | Noted: 2019-06-10 | Resolved: 2025-01-27

## 2025-01-27 PROBLEM — M22.41 CHONDROMALACIA OF RIGHT PATELLA: Status: RESOLVED | Noted: 2019-04-30 | Resolved: 2025-01-27

## 2025-01-27 PROCEDURE — 90471 IMMUNIZATION ADMIN: CPT | Performed by: FAMILY MEDICINE

## 2025-01-27 PROCEDURE — 99386 PREV VISIT NEW AGE 40-64: CPT | Performed by: FAMILY MEDICINE

## 2025-01-27 PROCEDURE — 90656 IIV3 VACC NO PRSV 0.5 ML IM: CPT | Performed by: FAMILY MEDICINE

## 2025-01-27 NOTE — ASSESSMENT & PLAN NOTE
New baseline labs. Has been off his levothyroxine for quite some time.   Orders:  •  Comprehensive metabolic panel; Future  •  TSH, 3rd generation; Future  •  Lipid panel  •  CBC and differential; Future  •  T4, free; Future

## 2025-01-27 NOTE — PROGRESS NOTES
Name: Sada Rhodes      : 1980      MRN: [id number]  Encounter Provider: Mahi Pacheco DO  Encounter Date: 2025   Encounter department: [facility]  :  Assessment & Plan  Annual physical exam  Discussed age appropriate preventative recommendations.          Encounter for immunization    Orders:  •  influenza vaccine preservative-free 0.5 mL IM (Fluzone, Afluria, Fluarix, Flulaval)    Hashimoto's thyroiditis  New baseline labs. Has been off his levothyroxine for quite some time.   Orders:  •  Comprehensive metabolic panel; Future  •  TSH, 3rd generation; Future  •  Lipid panel  •  CBC and differential; Future  •  T4, free; Future    Encounter for colorectal cancer screening    Orders:  •  Ambulatory Referral to Gastroenterology; Future  Re establish with Dr. Mccullough.    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 Discussed therapy, diagnosis, possible treatments. He will look into this.               History of Present Illness   New pt/re establish care: Prior pt of mine at [facility].     Was getting colonoscopies Q3 years due to family hx CRC. His last scope was 3-5 years ago, prev est'd with Dr. Mccullough. No changes in Bms. +Intentional weight loss over the past year.     Prev dx'd with Hashiomoto's, has long since run out of med. He reports  was a difficult year for him and he has not kept up with health maintenance. Prev on levo 75mcg.     Questionable ADD. Works 2 jobs. Also OCD tendencies. Just wondering what treatment options may be.     Prev dx'd with azoospermia. Approx 2016. He is wondering what caused this. He and his wife were previously looking into adoption as they were unable  "to get pregnant.  Was diagnosed in the [place] area, he had different insurance at the time. He would like to establish care locally.       Review of Systems   Constitutional:  Negative for chills and fever.   HENT:  Negative for ear pain and sore throat.    Eyes:  Negative for pain and visual disturbance.   Respiratory:  Negative for cough and shortness of breath.    Cardiovascular:  Negative for chest pain and palpitations.   Gastrointestinal:  Negative for abdominal pain and vomiting.   Genitourinary:  Negative for dysuria and hematuria.   Musculoskeletal:  Negative for arthralgias and back pain.   Skin:  Negative for color change and rash.   Neurological:  Negative for seizures and syncope.   Psychiatric/Behavioral:  Positive for decreased concentration. Negative for dysphoric mood and sleep disturbance. The patient is not nervous/anxious.    All other systems reviewed and are negative.      Objective   /70 (BP Location: Left arm, Patient Position: Sitting, Cuff Size: Adult)   Pulse (!) 53   Ht 5' 9\" (1.753 m)   Wt 68.9 kg (152 lb)   SpO2 99%   BMI 22.45 kg/m²      Physical Exam  Vitals and nursing note reviewed.   Constitutional:       General: He is not in acute distress.     Appearance: He is well-developed.   HENT:      Head: Normocephalic and atraumatic.   Eyes:      Conjunctiva/sclera: Conjunctivae normal.   Cardiovascular:      Rate and Rhythm: Normal rate and regular rhythm.      Heart sounds: No murmur heard.  Pulmonary:      Effort: Pulmonary effort is normal. No respiratory distress.      Breath sounds: Normal breath sounds.   Abdominal:      Palpations: Abdomen is soft.      Tenderness: There is no abdominal tenderness.   Musculoskeletal:         General: No swelling.      Cervical back: Neck supple.   Skin:     General: Skin is warm and dry.      Capillary Refill: Capillary refill takes less than 2 seconds.   Neurological:      Mental Status: He is alert.   Psychiatric:         Mood " and Affect: Mood normal.

## 2025-01-27 NOTE — LETTER
Procedure Request Form: Colonoscopy      Date Requested: 25  Patient: Sada Rhodes  Patient : 1980   Referring Provider: Mahi Pacheco, DO        Date of Procedure ______________________________       The above patient has informed us that they have completed their   most recent Colonoscopy at your facility. Please complete   this form and attach all corresponding procedure reports/results.    Comments __________________________________________________________  ____________________________________________________________________  ____________________________________________________________________  ____________________________________________________________________    Facility Completing Procedure _________________________________________    Form Completed By (print name) _______________________________________      Signature __________________________________________________________      These reports are needed for  compliance.    Please fax this completed form and a copy of the procedure report to our office located at [address] [place] [address] as soon as possible to Fax [phone] attention Ronda: Phone [phone]    We thank you for your assistance in treating our mutual patient.

## 2025-01-27 NOTE — TELEPHONE ENCOUNTER
----- Message from Brynn PARADA sent at 1/27/2025 10:28 AM EST -----  Regarding: colonoscopy  01/27/25 10:28 AM    Hello, our patient Sada Rhodes has had CRC: Colonoscopy completed/performed. Please assist in updating the patient chart by making an External outreach to [facility] facility located in [place]. The date of service is within the last 5 yrs.    Thank you,  [facility]

## 2025-01-27 NOTE — ASSESSMENT & PLAN NOTE
Pt describing sx's, sounds like perhaps some attention deficit plus some OCD tendencies. Discussed therapy, diagnosis, possible treatments. He will look into this.

## 2025-01-27 NOTE — TELEPHONE ENCOUNTER
Upon review of the In Basket request and the patient's chart, initial outreach has been made via fax to facility. Please see Contacts section for details.     Thank you  Ronda Emery MA

## 2025-01-31 ENCOUNTER — TELEPHONE (OUTPATIENT)
Age: 45
End: 2025-01-31

## 2025-01-31 NOTE — TELEPHONE ENCOUNTER
New Patient    Appointment Scheduling  What office location does the patient prefer?:[place]   What is the reason for the patient's appointment?: Patient would like a consult for infertility.  Check his sperm count.   Have patient records been requested?:  If No, are the records showing in Epic:     Appointment Details  Date: 02/19/25  Time:    10 am   Location:   [place]  Provider:  Cedric Mcgee PA-C   Does the appointment need further review? (Reason)      HISTORY  Is the patient having active symptoms? If so, describe symptoms:  Has the patient had any previous Urologist(s)?:no  Was the patient seen in the ED?:  Has the patient had any outside testing done?:  Does patient have Imaging/Lab Results:  Does the patient have a personal history of any cancer?:    INSURANCE   Have you confirmed Patient's insurance?[employer]   Is the insurance accepted?  Yes   Is the insurance active?yes

## 2025-01-31 NOTE — TELEPHONE ENCOUNTER
As a follow-up, a second attempt has been made for outreach via fax to facility. Please see Contacts section for details.    Thank you  Ronda Emery MA

## 2025-02-01 ENCOUNTER — APPOINTMENT (OUTPATIENT)
Dept: LAB | Facility: HOSPITAL | Age: 45
End: 2025-02-01
Attending: FAMILY MEDICINE
Payer: COMMERCIAL

## 2025-02-01 DIAGNOSIS — Z12.5 SCREENING FOR PROSTATE CANCER: ICD-10-CM

## 2025-02-01 DIAGNOSIS — E06.3 HASHIMOTO'S THYROIDITIS: ICD-10-CM

## 2025-02-01 LAB
ALBUMIN SERPL BCG-MCNC: 4.6 G/DL (ref 3.5–5)
ALP SERPL-CCNC: 64 U/L (ref 34–104)
ALT SERPL W P-5'-P-CCNC: 18 U/L (ref 7–52)
ANION GAP SERPL CALCULATED.3IONS-SCNC: 4 MMOL/L (ref 4–13)
AST SERPL W P-5'-P-CCNC: 15 U/L (ref 13–39)
BASOPHILS # BLD AUTO: 0.03 THOUSANDS/ΜL (ref 0–0.1)
BASOPHILS NFR BLD AUTO: 1 % (ref 0–1)
BILIRUB SERPL-MCNC: 0.82 MG/DL (ref 0.2–1)
BUN SERPL-MCNC: 10 MG/DL (ref 5–25)
CALCIUM SERPL-MCNC: 9.2 MG/DL (ref 8.4–10.2)
CHLORIDE SERPL-SCNC: 103 MMOL/L (ref 96–108)
CHOLEST SERPL-MCNC: 138 MG/DL (ref ?–200)
CO2 SERPL-SCNC: 30 MMOL/L (ref 21–32)
CREAT SERPL-MCNC: 1.24 MG/DL (ref 0.6–1.3)
EOSINOPHIL # BLD AUTO: 0.14 THOUSAND/ΜL (ref 0–0.61)
EOSINOPHIL NFR BLD AUTO: 4 % (ref 0–6)
ERYTHROCYTE [DISTWIDTH] IN BLOOD BY AUTOMATED COUNT: 12.9 % (ref 11.6–15.1)
GFR SERPL CREATININE-BSD FRML MDRD: 69 ML/MIN/1.73SQ M
GLUCOSE P FAST SERPL-MCNC: 86 MG/DL (ref 65–99)
HCT VFR BLD AUTO: 44.2 % (ref 36.5–49.3)
HDLC SERPL-MCNC: 58 MG/DL
HGB BLD-MCNC: 14.4 G/DL (ref 12–17)
IMM GRANULOCYTES # BLD AUTO: 0 THOUSAND/UL (ref 0–0.2)
IMM GRANULOCYTES NFR BLD AUTO: 0 % (ref 0–2)
LDLC SERPL CALC-MCNC: 70 MG/DL (ref 0–100)
LYMPHOCYTES # BLD AUTO: 1.24 THOUSANDS/ΜL (ref 0.6–4.47)
LYMPHOCYTES NFR BLD AUTO: 38 % (ref 14–44)
MCH RBC QN AUTO: 29.7 PG (ref 26.8–34.3)
MCHC RBC AUTO-ENTMCNC: 32.6 G/DL (ref 31.4–37.4)
MCV RBC AUTO: 91 FL (ref 82–98)
MONOCYTES # BLD AUTO: 0.24 THOUSAND/ΜL (ref 0.17–1.22)
MONOCYTES NFR BLD AUTO: 7 % (ref 4–12)
NEUTROPHILS # BLD AUTO: 1.64 THOUSANDS/ΜL (ref 1.85–7.62)
NEUTS SEG NFR BLD AUTO: 50 % (ref 43–75)
NONHDLC SERPL-MCNC: 80 MG/DL
NRBC BLD AUTO-RTO: 0 /100 WBCS
PLATELET # BLD AUTO: 167 THOUSANDS/UL (ref 149–390)
PMV BLD AUTO: 11.4 FL (ref 8.9–12.7)
POTASSIUM SERPL-SCNC: 4.4 MMOL/L (ref 3.5–5.3)
PROT SERPL-MCNC: 7.2 G/DL (ref 6.4–8.4)
PSA SERPL-MCNC: 0.98 NG/ML (ref 0–4)
RBC # BLD AUTO: 4.85 MILLION/UL (ref 3.88–5.62)
SODIUM SERPL-SCNC: 137 MMOL/L (ref 135–147)
T4 FREE SERPL-MCNC: 0.74 NG/DL (ref 0.61–1.12)
TRIGL SERPL-MCNC: 52 MG/DL (ref ?–150)
TSH SERPL DL<=0.05 MIU/L-ACNC: 21.81 UIU/ML (ref 0.45–4.5)
WBC # BLD AUTO: 3.29 THOUSAND/UL (ref 4.31–10.16)

## 2025-02-01 PROCEDURE — 85025 COMPLETE CBC W/AUTO DIFF WBC: CPT

## 2025-02-01 PROCEDURE — 36415 COLL VENOUS BLD VENIPUNCTURE: CPT

## 2025-02-01 PROCEDURE — 84443 ASSAY THYROID STIM HORMONE: CPT

## 2025-02-01 PROCEDURE — 80061 LIPID PANEL: CPT | Performed by: FAMILY MEDICINE

## 2025-02-01 PROCEDURE — 84439 ASSAY OF FREE THYROXINE: CPT

## 2025-02-01 PROCEDURE — G0103 PSA SCREENING: HCPCS

## 2025-02-01 PROCEDURE — 80053 COMPREHEN METABOLIC PANEL: CPT

## 2025-02-03 ENCOUNTER — RESULTS FOLLOW-UP (OUTPATIENT)
Dept: FAMILY MEDICINE CLINIC | Facility: CLINIC | Age: 45
End: 2025-02-03

## 2025-02-03 DIAGNOSIS — E06.3 HASHIMOTO'S THYROIDITIS: Primary | ICD-10-CM

## 2025-02-03 RX ORDER — LEVOTHYROXINE SODIUM 50 UG/1
TABLET ORAL
Qty: 90 TABLET | Refills: 0 | Status: SHIPPED | OUTPATIENT
Start: 2025-02-03 | End: 2025-02-06 | Stop reason: SDUPTHER

## 2025-02-03 NOTE — RESULT ENCOUNTER NOTE
Please notify patient all labs are stable except his thyroid.  I will send in refills of his thyroid medication, plus orders to repeat thyroid function tests in 6 to 8 weeks.

## 2025-02-04 DIAGNOSIS — E06.3 HASHIMOTO'S THYROIDITIS: ICD-10-CM

## 2025-02-05 RX ORDER — LEVOTHYROXINE SODIUM 50 UG/1
TABLET ORAL
Qty: 90 TABLET | Refills: 0 | OUTPATIENT
Start: 2025-02-05

## 2025-02-05 NOTE — TELEPHONE ENCOUNTER
duplicate request sent to [id number]  on 03/02/25   with 90 and 0 refill(s) my chart message sent

## 2025-02-06 ENCOUNTER — TELEPHONE (OUTPATIENT)
Age: 45
End: 2025-02-06

## 2025-02-06 ENCOUNTER — OFFICE VISIT (OUTPATIENT)
Dept: FAMILY MEDICINE CLINIC | Facility: CLINIC | Age: 45
End: 2025-02-06
Payer: COMMERCIAL

## 2025-02-06 VITALS
OXYGEN SATURATION: 97 % | SYSTOLIC BLOOD PRESSURE: 120 MMHG | HEART RATE: 71 BPM | HEIGHT: 69 IN | DIASTOLIC BLOOD PRESSURE: 78 MMHG | TEMPERATURE: 98.6 F | WEIGHT: 159 LBS | BODY MASS INDEX: 23.55 KG/M2

## 2025-02-06 DIAGNOSIS — E06.3 HASHIMOTO'S THYROIDITIS: ICD-10-CM

## 2025-02-06 DIAGNOSIS — R05.1 ACUTE COUGH: Primary | ICD-10-CM

## 2025-02-06 PROCEDURE — 87811 SARS-COV-2 COVID19 W/OPTIC: CPT | Performed by: NURSE PRACTITIONER

## 2025-02-06 PROCEDURE — 99213 OFFICE O/P EST LOW 20 MIN: CPT | Performed by: NURSE PRACTITIONER

## 2025-02-06 RX ORDER — LEVOTHYROXINE SODIUM 50 UG/1
TABLET ORAL
Qty: 90 TABLET | Refills: 0 | Status: SHIPPED | OUTPATIENT
Start: 2025-02-06 | End: 2025-02-06 | Stop reason: SDUPTHER

## 2025-02-06 RX ORDER — LEVOTHYROXINE SODIUM 50 UG/1
TABLET ORAL
Qty: 90 TABLET | Refills: 0 | Status: SHIPPED | OUTPATIENT
Start: 2025-02-06

## 2025-02-06 RX ORDER — DEXTROMETHORPHAN HYDROBROMIDE AND PROMETHAZINE HYDROCHLORIDE 15; 6.25 MG/5ML; MG/5ML
5 SYRUP ORAL 4 TIMES DAILY PRN
Qty: 120 ML | Refills: 1 | Status: SHIPPED | OUTPATIENT
Start: 2025-02-06

## 2025-02-06 NOTE — TELEPHONE ENCOUNTER
Patient had his script levothyroxine 50 mcg tablet sent to [facility]. Patient found out [facility] is no longer in network.Wife is asking if a paper script can be printed out and he pick it up to take to a pharmacy in network with his insurance? Please advise.

## 2025-02-06 NOTE — TELEPHONE ENCOUNTER
As a final attempt, a third outreach has been made via fax to facility. Please see Contacts section for details. This encounter will be closed and completed by end of day. Should we receive the requested information because of previous outreach attempts, the requested patient's chart will be updated appropriately.     Thank you  Ronda Emery MA

## 2025-02-07 LAB
SARS-COV-2 AG UPPER RESP QL IA: NEGATIVE
VALID CONTROL: NORMAL

## 2025-02-07 NOTE — PROGRESS NOTES
"Name: Sada Rhodes      : 1980      MRN: [id number]  Encounter Provider: OMARI Friedman  Encounter Date: 2025   Encounter department: [facility] PRIMARY [facility]  :  Assessment & Plan  Acute cough    Orders:  •  POCT Rapid Covid Ag  •  promethazine-dextromethorphan (PHENERGAN-DM) 6.25-15 mg/5 mL oral syrup; Take 5 mL by mouth 4 (four) times a day as needed for cough    Fluids rest follow-up if unresolved       History of Present Illness   Patient has been coughing for the last 3 for 5 days.  No fever or chills.  No shortness of breath some mild postnasal drip taking DayQuil.      Review of Systems    Objective   /78 (BP Location: Left arm, Patient Position: Sitting, Cuff Size: Standard)   Pulse 71   Temp 98.6 °F (37 °C) (Tympanic)   Ht 5' 9\" (1.753 m)   Wt 72.1 kg (159 lb)   SpO2 97%   BMI 23.48 kg/m²      Physical Exam  HENT:      Right Ear: Tympanic membrane normal.      Left Ear: Tympanic membrane normal.      Nose: Congestion present. No rhinorrhea.   Cardiovascular:      Rate and Rhythm: Normal rate and regular rhythm.   Pulmonary:      Effort: Pulmonary effort is normal.      Comments: Periodic cough during exam  Lymphadenopathy:      Cervical: No cervical adenopathy.   Psychiatric:         Mood and Affect: Mood normal.         "

## 2025-02-10 DIAGNOSIS — R05.1 ACUTE COUGH: ICD-10-CM

## 2025-02-11 RX ORDER — DEXTROMETHORPHAN HYDROBROMIDE AND PROMETHAZINE HYDROCHLORIDE 15; 6.25 MG/5ML; MG/5ML
5 SYRUP ORAL 4 TIMES DAILY PRN
Qty: 120 ML | Refills: 0 | OUTPATIENT
Start: 2025-02-11

## 2025-02-13 NOTE — TELEPHONE ENCOUNTER
Upon review of the In Basket request we have found that the patient has not yet had the requested item completed or has not established care. Due to protocols, we are unable to hold requests for resulting/linking of a future items and are unable to proceed. Patients who have not established care within the Network do not have consents on file, record requests, etc.    Patient has an appt on Monday 2/17 for colon    Any additional questions or concerns should be emailed to the Practice Liaisons via the appropriate education email address, please do not reply via In Basket.    Thank you  Ronda Emery MA   [facility]

## 2025-02-19 ENCOUNTER — CONSULT (OUTPATIENT)
Dept: UROLOGY | Facility: AMBULATORY SURGERY CENTER | Age: 45
End: 2025-02-19
Payer: COMMERCIAL

## 2025-02-19 VITALS
HEIGHT: 69 IN | DIASTOLIC BLOOD PRESSURE: 72 MMHG | HEART RATE: 61 BPM | BODY MASS INDEX: 22.66 KG/M2 | SYSTOLIC BLOOD PRESSURE: 110 MMHG | OXYGEN SATURATION: 92 % | WEIGHT: 153 LBS

## 2025-02-19 DIAGNOSIS — N46.01 INFERTILITY DUE TO AZOOSPERMIA: ICD-10-CM

## 2025-02-19 PROCEDURE — 99204 OFFICE O/P NEW MOD 45 MIN: CPT

## 2025-02-19 NOTE — ASSESSMENT & PLAN NOTE
We discussed today in the office that our urological practice does not treat male infertility.  However, we discussed that we could pursue further workup for male infertility with referral to a fertility specialist outside of the network.  We discussed that workup would include an ultrasound of the scrotum and testicle as well as a repeat semen analysis since we do not have the records of his one performed in 2016.  Testicular and scrotal exam performed today.  No abnormalities noted on physical exam.  Refer to physical exam findings.  Patient will undergo a comprehensive semen analysis as well as a ultrasound of the scrotum and testicle.  Our office will call with results and the patient will follow-up as needed.

## 2025-02-19 NOTE — PROGRESS NOTES
2/19/2025      Assessment and Plan    44 y.o. 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  However, we discussed that we could pursue further workup for male infertility with referral to a fertility specialist outside of the network.  We discussed that workup would include an ultrasound of the scrotum and testicle as well as a repeat semen analysis since we do not have the records of his one performed in 2016.  Testicular and scrotal exam performed today.  No abnormalities noted on physical exam.  Refer to physical exam findings.  Patient will undergo a comprehensive semen analysis as well as a ultrasound of the scrotum and testicle.  Our office will call with results and the patient will follow-up as needed.        History of Present Illness  Sada Rhodes is a 44 y.o. male here for evaluation of male infertility.  Patient was referred to our practice by his PCP after consultation on 1/27/2025.  Patient noted that time that he was previously diagnosed with azoospermia in approximately 2016.  Today, the patient reports that him and his wife were attempting to conceive a child in 2016.  The patient notes that at that time his wife underwent testing to determine if she was infertile, but was found that she was able to undergo pregnancy.  Patient notes that then he underwent testing and was diagnosed with azoospermia and thereafter never underwent further fertility testing or workup.  Additionally, the patient notes that they are after him and his wife stopped attempting to conceive a child due to COVID and financial instability.  Patient notes that him and his wife have been exploring options of adoption, but would like to pursue answers for what caused his azoospermia.  Otherwise, the patient offers no other lower urinary tract complaints today's office visit.        Review of  "Systems   Constitutional:  Negative for chills and fever.   HENT:  Negative for ear pain and sore throat.    Eyes:  Negative for pain and visual disturbance.   Respiratory:  Negative for cough and shortness of breath.    Cardiovascular:  Negative for chest pain and palpitations.   Gastrointestinal:  Negative for abdominal pain and vomiting.   Genitourinary:  Negative for decreased urine volume, difficulty urinating, dysuria, flank pain, frequency, hematuria and urgency.   Musculoskeletal:  Negative for arthralgias and back pain.   Skin:  Negative for color change and rash.   Neurological:  Negative for seizures and syncope.   All other systems reviewed and are negative.          AUA SYMPTOM SCORE      Flowsheet Row Most Recent Value   AUA SYMPTOM SCORE    How often have you had a sensation of not emptying your bladder completely after you finished urinating? 0 (P)     How often have you had to urinate again less than two hours after you finished urinating? 3 (P)     How often have you found you stopped and started again several times when you urinate? 0 (P)     How often have you found it difficult to postpone urination? 0 (P)     How often have you had a weak urinary stream? 0 (P)     How often have you had to push or strain to begin urination? 0 (P)     How many times did you most typically get up to urinate from the time you went to bed at night until the time you got up in the morning? 1 (P)     Quality of Life: If you were to spend the rest of your life with your urinary condition just the way it is now, how would you feel about that? 1 (P)     AUA SYMPTOM SCORE 4 (P)               Vitals  Vitals:    02/19/25 0957   BP: 110/72   BP Location: Left arm   Patient Position: Sitting   Cuff Size: Adult   Pulse: 61   SpO2: 92%   Weight: 69.4 kg (153 lb)   Height: 5' 9\" (1.753 m)       Physical Exam  Vitals reviewed.   Constitutional:       General: He is not in acute distress.     Appearance: Normal appearance. He is " not ill-appearing.   HENT:      Head: Normocephalic and atraumatic.      Nose: Nose normal.   Eyes:      General: No scleral icterus.  Pulmonary:      Effort: No respiratory distress.   Abdominal:      General: Abdomen is flat. There is no distension.      Palpations: Abdomen is soft.      Tenderness: There is no abdominal tenderness.   Genitourinary:     Comments: Normal phallus, testes descended bilaterally smooth without nodularity.  No varicoceles, hydroceles, or extratesticular mass palpated on physical exam.  Musculoskeletal:         General: Normal range of motion.      Cervical back: Normal range of motion.   Skin:     General: Skin is warm.      Coloration: Skin is not jaundiced.   Neurological:      Mental Status: He is alert and oriented to person, place, and time.      Gait: Gait normal.   Psychiatric:         Mood and Affect: Mood normal.         Behavior: Behavior normal.           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    Chronic instability of knee 06/10/2019    Congenital pes planus 04/30/2019    Depression     Disease of thyroid gland     Hand joint pain 01/13/2020    Hyperthyroidism 09/29/2016    Pain in right knee 04/30/2019    Testicular hypofunction 04/28/2016     Social History     Socioeconomic History    Marital status: /Civil Union     Spouse name: None    Number of children: None    Years of education: None    Highest education level: None   Occupational History    None   Tobacco Use    Smoking status: Never    Smokeless tobacco: Never   Vaping Use    Vaping status: Never Used   Substance and Sexual Activity    Alcohol use: Not Currently     Comment: occsionally    Drug use: Never    Sexual activity: Not Currently     Partners: Female   Other Topics Concern    None   Social History Narrative    None     Social Drivers of Health     Financial Resource Strain: Not on file   Food Insecurity: Not on file    Transportation Needs: Not on file   Physical Activity: Not on file   Stress: Not on file   Social Connections: Not on file   Intimate Partner Violence: Not on file   Housing Stability: Not on file     Social History     Tobacco Use   Smoking Status Never   Smokeless Tobacco Never     Family History   Problem Relation Age of Onset    Cancer Mother     Heart disease Mother     Colon cancer Mother     Colon cancer Maternal Aunt        The following portions of the patient's history were reviewed and updated as appropriate: allergies, current medications, past medical history, past social history, past surgical history and problem list.    Results  No results found for this or any previous visit (from the past hour).]  Lab Results   Component Value Date    PSA 0.983 02/01/2025    PSA 0.7 05/20/2021     Lab Results   Component Value Date    CALCIUM 9.2 02/01/2025    K 4.4 02/01/2025    CO2 30 02/01/2025     02/01/2025    BUN 10 02/01/2025    CREATININE 1.24 02/01/2025     Lab Results   Component Value Date    WBC 3.29 (L) 02/01/2025    HGB 14.4 02/01/2025    HCT 44.2 02/01/2025    MCV 91 02/01/2025     02/01/2025

## 2025-02-21 ENCOUNTER — TELEPHONE (OUTPATIENT)
Age: 45
End: 2025-02-21

## 2025-02-24 ENCOUNTER — HOSPITAL ENCOUNTER (OUTPATIENT)
Dept: RADIOLOGY | Facility: HOSPITAL | Age: 45
Discharge: HOME | End: 2025-02-24
Payer: COMMERCIAL

## 2025-02-24 DIAGNOSIS — N46.01 INFERTILITY DUE TO AZOOSPERMIA: ICD-10-CM

## 2025-02-24 PROCEDURE — 76870 US EXAM SCROTUM: CPT

## 2025-02-28 ENCOUNTER — RESULTS FOLLOW-UP (OUTPATIENT)
Dept: UROLOGY | Facility: MEDICAL CENTER | Age: 45
End: 2025-02-28

## 2025-02-28 DIAGNOSIS — N46.01 INFERTILITY DUE TO AZOOSPERMIA: Primary | ICD-10-CM

## 2025-02-28 NOTE — TELEPHONE ENCOUNTER
I was able to call and speak with patient to let him know we reviewed his most recent ultrasound of the scrotum and testicles. The patient was noted to have a right sided varicocele, which is a dilation of veins within the testicle and is a benign finding. Additionally, patient was noted to have bilateral benign scrotal lifts. These are stones made of blood protein that sit on the testicles and are once again a benign finding and warrant no treatment or follow-up. Patient understood everything and is aware of his results.

## 2025-02-28 NOTE — TELEPHONE ENCOUNTER
----- Message from Cedric Mcgee PA-C sent at 2/28/2025 12:26 PM EST -----  Please call the patient advise him I reviewed his most recent ultrasound of the scrotum and testicles.  The patient was noted to have a right sided varicocele, which is a dilation of veins within the testicle and is a benign finding.  Additionally, patient was noted to have bilateral benign scrotal lifts.  These are stones made of blood protein that sit on the testicles and are once again a benign finding and warrant no treatment or follow-up.

## 2025-03-04 ENCOUNTER — APPOINTMENT (OUTPATIENT)
Dept: LAB | Facility: CLINIC | Age: 45
End: 2025-03-04
Payer: COMMERCIAL

## 2025-03-04 DIAGNOSIS — N46.01 INFERTILITY DUE TO AZOOSPERMIA: Primary | ICD-10-CM

## 2025-03-04 DIAGNOSIS — N46.01 INFERTILITY DUE TO AZOOSPERMIA: ICD-10-CM

## 2025-03-04 LAB
COLLECTION DATE & TIME: ABNORMAL
LIQUEFACTION TIME SMN: 30 MIN (ref 0–30)
PH SMN: 8.1 [PH] (ref 7.2–8.6)
SEX ABSTIN DURATION TIME PATIENT: 7 D (ref 2–7)
SPECIMEN VOL SMN: 0.7 ML (ref 1–5)
SPERM # SMN: 0 MILLION/EJACULATION (ref 40–1000)
SPERM SMN: 0 /ML (ref 20–999)
VISC SMN: 4 CP (ref 3–4)

## 2025-03-04 PROCEDURE — 89320 SEMEN ANAL VOL/COUNT/MOT: CPT

## 2025-03-05 NOTE — TELEPHONE ENCOUNTER
"----- Message from Cedric Mcgee PA-C sent at 3/5/2025  9:16 AM EST -----  Please call the patient advise him that I reviewed his most recent semen analysis and did return with 0 sperm in the semen, which is the definition of azoospermia.  I did place a referral to [facility].  I do recommend the patient follow-up with them in this regard for further evaluation.  Additionally, I would note that the  recommended a follow-up test \" fructose, semen analysis\".  "

## 2025-03-18 ENCOUNTER — TRANSCRIBE ORDERS (OUTPATIENT)
Dept: LAB | Facility: HOSPITAL | Age: 45
End: 2025-03-18

## 2025-03-18 DIAGNOSIS — N46.9 MALE INFERTILITY, UNSPECIFIED: Primary | ICD-10-CM

## 2025-03-21 ENCOUNTER — HOSPITAL ENCOUNTER (OUTPATIENT)
Dept: ULTRASOUND IMAGING | Facility: HOSPITAL | Age: 45
End: 2025-03-21
Payer: COMMERCIAL

## 2025-03-21 DIAGNOSIS — N46.9 MALE INFERTILITY, UNSPECIFIED: ICD-10-CM

## 2025-03-21 PROCEDURE — 76700 US EXAM ABDOM COMPLETE: CPT

## 2025-03-22 ENCOUNTER — APPOINTMENT (OUTPATIENT)
Dept: LAB | Facility: HOSPITAL | Age: 45
End: 2025-03-22
Payer: COMMERCIAL

## 2025-03-22 LAB
FSH SERPL-ACNC: 26.6 MIU/ML (ref 1.3–19.3)
LH SERPL-ACNC: 9.9 MIU/ML (ref 1.2–8.6)

## 2025-03-22 PROCEDURE — 83001 ASSAY OF GONADOTROPIN (FSH): CPT

## 2025-03-22 PROCEDURE — 83002 ASSAY OF GONADOTROPIN (LH): CPT

## 2025-06-11 ENCOUNTER — OFFICE VISIT (OUTPATIENT)
Dept: FAMILY MEDICINE CLINIC | Facility: CLINIC | Age: 45
End: 2025-06-11
Payer: COMMERCIAL

## 2025-06-11 VITALS
TEMPERATURE: 98.5 F | DIASTOLIC BLOOD PRESSURE: 78 MMHG | SYSTOLIC BLOOD PRESSURE: 120 MMHG | BODY MASS INDEX: 23.7 KG/M2 | OXYGEN SATURATION: 98 % | HEIGHT: 69 IN | HEART RATE: 72 BPM | WEIGHT: 160 LBS

## 2025-06-11 DIAGNOSIS — E06.3 HASHIMOTO'S THYROIDITIS: Primary | ICD-10-CM

## 2025-06-11 DIAGNOSIS — Z80.0 FAMILY HISTORY OF MALIGNANT NEOPLASM OF GASTROINTESTINAL TRACT: ICD-10-CM

## 2025-06-11 DIAGNOSIS — N46.01 INFERTILITY DUE TO AZOOSPERMIA: ICD-10-CM

## 2025-06-11 PROBLEM — E55.9 VITAMIN D DEFICIENCY: Status: RESOLVED | Noted: 2025-06-11 | Resolved: 2025-06-11

## 2025-06-11 PROBLEM — M25.549 ARTHRALGIA OF HAND: Status: RESOLVED | Noted: 2020-01-13 | Resolved: 2025-06-11

## 2025-06-11 PROBLEM — N20.0 KIDNEY STONE: Status: RESOLVED | Noted: 2025-06-11 | Resolved: 2025-06-11

## 2025-06-11 PROCEDURE — 99213 OFFICE O/P EST LOW 20 MIN: CPT | Performed by: FAMILY MEDICINE

## 2025-06-11 RX ORDER — LEVOTHYROXINE SODIUM 50 UG/1
TABLET ORAL
Qty: 90 TABLET | Refills: 0 | Status: SHIPPED | OUTPATIENT
Start: 2025-06-11

## 2025-06-11 NOTE — PROGRESS NOTES
Name: Sada Rhodes      : 1980      MRN: [id number]  Encounter Provider: Mahi Pacheco DO  Encounter Date: 2025   Encounter department: [facility]  :  Assessment & Plan  Hashimoto's thyroiditis  Ran out of levothyroxine . Refilled at prior dose, patient overdue for TFTs.  He will obtain later this week.  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        Family history of malignant neoplasm of gastrointestinal tract  Screening up to date.               History of Present Illness   Prev dx'd with Hashiomoto's,  issues with compliance taking med. Ran out again a few wks ago.     Was getting colonoscopies Q3 years due to family hx CRC. Had another recent colonoscopy. Dr. Mccullough follows. Will need repeat  (extending to Q4 yrs now).      Prev dx'd with azoospermia roughly 2016. Recently referred to urology.       Review of Systems   Constitutional:  Positive for fatigue. Negative for chills and fever.   HENT:  Negative for ear pain and sore throat.    Eyes:  Negative for pain and visual disturbance.   Respiratory:  Negative for cough and shortness of breath.    Cardiovascular:  Negative for chest pain and palpitations.   Gastrointestinal:  Negative for abdominal pain and vomiting.   Genitourinary:  Negative for dysuria and hematuria.   Musculoskeletal:  Negative for arthralgias and back pain.   Skin:  Negative for color change and rash.   Neurological:  Negative for seizures and syncope.   Psychiatric/Behavioral:  Positive for decreased concentration. Negative for dysphoric mood and sleep disturbance. The patient is not nervous/anxious.    All other systems reviewed and are negative.      Objective   /78 (BP Location: Left arm, Patient  "Position: Sitting, Cuff Size: Standard)   Pulse 72   Temp 98.5 °F (36.9 °C) (Tympanic)   Ht 5' 9\" (1.753 m)   Wt 72.6 kg (160 lb)   SpO2 98%   BMI 23.63 kg/m²      Physical Exam  Vitals and nursing note reviewed.   Constitutional:       General: He is not in acute distress.     Appearance: He is well-developed.   HENT:      Head: Normocephalic and atraumatic.     Eyes:      Conjunctiva/sclera: Conjunctivae normal.       Cardiovascular:      Rate and Rhythm: Normal rate and regular rhythm.      Heart sounds: No murmur heard.  Pulmonary:      Effort: Pulmonary effort is normal. No respiratory distress.      Breath sounds: Normal breath sounds.   Abdominal:      Palpations: Abdomen is soft.      Tenderness: There is no abdominal tenderness.     Musculoskeletal:         General: No swelling.      Cervical back: Neck supple.     Skin:     General: Skin is warm and dry.      Capillary Refill: Capillary refill takes less than 2 seconds.     Neurological:      Mental Status: He is alert.     Psychiatric:         Mood and Affect: Mood normal.         "

## 2025-06-11 NOTE — ASSESSMENT & PLAN NOTE
Ran out of levothyroxine . Refilled at prior dose, patient overdue for TFTs.  He will obtain later this week.  Orders:  •  TSH, 3rd generation  •  T4, free  •  levothyroxine 50 mcg tablet; Take one tablet daily on empty stomach 1 hour before breakfast and 4 hours apart from calcium and vitamin D supplementation

## 2025-06-17 ENCOUNTER — APPOINTMENT (OUTPATIENT)
Dept: LAB | Facility: HOSPITAL | Age: 45
End: 2025-06-17
Attending: FAMILY MEDICINE
Payer: COMMERCIAL

## 2025-06-17 LAB
T4 FREE SERPL-MCNC: 0.68 NG/DL (ref 0.61–1.12)
TSH SERPL DL<=0.05 MIU/L-ACNC: 19.85 UIU/ML (ref 0.45–4.5)

## 2025-06-17 PROCEDURE — 84443 ASSAY THYROID STIM HORMONE: CPT | Performed by: FAMILY MEDICINE

## 2025-06-17 PROCEDURE — 36415 COLL VENOUS BLD VENIPUNCTURE: CPT | Performed by: FAMILY MEDICINE

## 2025-06-17 PROCEDURE — 84439 ASSAY OF FREE THYROXINE: CPT | Performed by: FAMILY MEDICINE

## 2025-06-18 ENCOUNTER — RESULTS FOLLOW-UP (OUTPATIENT)
Dept: FAMILY MEDICINE CLINIC | Facility: CLINIC | Age: 45
End: 2025-06-18

## 2025-06-20 DIAGNOSIS — E03.9 ACQUIRED HYPOTHYROIDISM: Primary | ICD-10-CM
